# Patient Record
Sex: MALE | Race: WHITE | Employment: OTHER | URBAN - METROPOLITAN AREA
[De-identification: names, ages, dates, MRNs, and addresses within clinical notes are randomized per-mention and may not be internally consistent; named-entity substitution may affect disease eponyms.]

---

## 2017-01-09 RX ORDER — TADALAFIL 5 MG
TABLET ORAL
Qty: 90 TABLET | Refills: 3 | Status: SHIPPED | OUTPATIENT
Start: 2017-01-09 | End: 2017-12-29 | Stop reason: SDUPTHER

## 2017-03-14 ENCOUNTER — TELEPHONE (OUTPATIENT)
Dept: INTERNAL MEDICINE CLINIC | Age: 77
End: 2017-03-14

## 2017-03-15 ENCOUNTER — OFFICE VISIT (OUTPATIENT)
Dept: INTERNAL MEDICINE CLINIC | Age: 77
End: 2017-03-15

## 2017-03-15 VITALS
HEIGHT: 73 IN | RESPIRATION RATE: 14 BRPM | DIASTOLIC BLOOD PRESSURE: 80 MMHG | WEIGHT: 193.6 LBS | HEART RATE: 92 BPM | SYSTOLIC BLOOD PRESSURE: 120 MMHG | BODY MASS INDEX: 25.66 KG/M2

## 2017-03-15 DIAGNOSIS — I10 ESSENTIAL HYPERTENSION: Primary | ICD-10-CM

## 2017-03-15 DIAGNOSIS — R73.02 GLUCOSE INTOLERANCE (IMPAIRED GLUCOSE TOLERANCE): ICD-10-CM

## 2017-03-15 DIAGNOSIS — E55.9 VITAMIN D DEFICIENCY: ICD-10-CM

## 2017-03-15 DIAGNOSIS — E03.4 HYPOTHYROIDISM DUE TO ACQUIRED ATROPHY OF THYROID: ICD-10-CM

## 2017-03-15 DIAGNOSIS — R00.2 PALPITATIONS: ICD-10-CM

## 2017-03-15 DIAGNOSIS — N40.0 PROSTATE HYPERTROPHY: ICD-10-CM

## 2017-03-15 DIAGNOSIS — E78.2 MIXED HYPERLIPIDEMIA: ICD-10-CM

## 2017-03-15 DIAGNOSIS — K63.5 COLONIC POLYP: ICD-10-CM

## 2017-03-15 LAB
A/G RATIO: 2 (ref 1.1–2.2)
ALBUMIN SERPL-MCNC: 4.5 G/DL (ref 3.4–5)
ALP BLD-CCNC: 104 U/L (ref 40–129)
ALT SERPL-CCNC: 33 U/L (ref 10–40)
ANION GAP SERPL CALCULATED.3IONS-SCNC: 14 MMOL/L (ref 3–16)
AST SERPL-CCNC: 26 U/L (ref 15–37)
BASOPHILS ABSOLUTE: 0 K/UL (ref 0–0.2)
BASOPHILS RELATIVE PERCENT: 1 %
BILIRUB SERPL-MCNC: 0.4 MG/DL (ref 0–1)
BILIRUBIN URINE: NEGATIVE
BLOOD, URINE: NEGATIVE
BUN BLDV-MCNC: 21 MG/DL (ref 7–20)
CALCIUM SERPL-MCNC: 8.9 MG/DL (ref 8.3–10.6)
CHLORIDE BLD-SCNC: 106 MMOL/L (ref 99–110)
CHOLESTEROL, TOTAL: 161 MG/DL (ref 0–199)
CLARITY: ABNORMAL
CO2: 23 MMOL/L (ref 21–32)
COLOR: YELLOW
CREAT SERPL-MCNC: 0.9 MG/DL (ref 0.8–1.3)
EOSINOPHILS ABSOLUTE: 0 K/UL (ref 0–0.6)
EOSINOPHILS RELATIVE PERCENT: 0.9 %
EPITHELIAL CELLS, UA: 1 /HPF (ref 0–5)
GFR AFRICAN AMERICAN: >60
GFR NON-AFRICAN AMERICAN: >60
GLOBULIN: 2.2 G/DL
GLUCOSE BLD-MCNC: 131 MG/DL (ref 70–99)
GLUCOSE URINE: NEGATIVE MG/DL
HCT VFR BLD CALC: 47.5 % (ref 40.5–52.5)
HDLC SERPL-MCNC: 46 MG/DL (ref 40–60)
HEMOGLOBIN: 16.1 G/DL (ref 13.5–17.5)
HYALINE CASTS: 0 /HPF (ref 0–8)
KETONES, URINE: NEGATIVE MG/DL
LDL CHOLESTEROL CALCULATED: 101 MG/DL
LEUKOCYTE ESTERASE, URINE: NEGATIVE
LYMPHOCYTES ABSOLUTE: 1 K/UL (ref 1–5.1)
LYMPHOCYTES RELATIVE PERCENT: 24.3 %
MCH RBC QN AUTO: 34 PG (ref 26–34)
MCHC RBC AUTO-ENTMCNC: 33.8 G/DL (ref 31–36)
MCV RBC AUTO: 100.4 FL (ref 80–100)
MICROSCOPIC EXAMINATION: YES
MONOCYTES ABSOLUTE: 0.3 K/UL (ref 0–1.3)
MONOCYTES RELATIVE PERCENT: 7.4 %
NEUTROPHILS ABSOLUTE: 2.7 K/UL (ref 1.7–7.7)
NEUTROPHILS RELATIVE PERCENT: 66.4 %
NITRITE, URINE: NEGATIVE
PDW BLD-RTO: 13.3 % (ref 12.4–15.4)
PH UA: 5.5
PLATELET # BLD: 240 K/UL (ref 135–450)
PMV BLD AUTO: 7.2 FL (ref 5–10.5)
POTASSIUM SERPL-SCNC: 4.5 MMOL/L (ref 3.5–5.1)
PROSTATE SPECIFIC ANTIGEN: 3.02 NG/ML (ref 0–4)
PROTEIN UA: NEGATIVE MG/DL
RBC # BLD: 4.73 M/UL (ref 4.2–5.9)
RBC UA: 4 /HPF (ref 0–4)
SODIUM BLD-SCNC: 143 MMOL/L (ref 136–145)
SPECIFIC GRAVITY UA: 1.02
TOTAL PROTEIN: 6.7 G/DL (ref 6.4–8.2)
TRIGL SERPL-MCNC: 68 MG/DL (ref 0–150)
TSH SERPL DL<=0.05 MIU/L-ACNC: 1.66 UIU/ML (ref 0.27–4.2)
UROBILINOGEN, URINE: 0.2 E.U./DL
VITAMIN D 25-HYDROXY: 32.9 NG/ML
VLDLC SERPL CALC-MCNC: 14 MG/DL
WBC # BLD: 4.1 K/UL (ref 4–11)
WBC UA: 1 /HPF (ref 0–5)

## 2017-03-15 PROCEDURE — 81001 URINALYSIS AUTO W/SCOPE: CPT | Performed by: INTERNAL MEDICINE

## 2017-03-15 PROCEDURE — 99215 OFFICE O/P EST HI 40 MIN: CPT | Performed by: INTERNAL MEDICINE

## 2017-03-15 PROCEDURE — 93000 ELECTROCARDIOGRAM COMPLETE: CPT | Performed by: INTERNAL MEDICINE

## 2017-03-15 PROCEDURE — 36415 COLL VENOUS BLD VENIPUNCTURE: CPT | Performed by: INTERNAL MEDICINE

## 2017-03-16 LAB
ESTIMATED AVERAGE GLUCOSE: 154.2 MG/DL
HBA1C MFR BLD: 7 %

## 2017-03-20 DIAGNOSIS — K63.5 COLONIC POLYP: ICD-10-CM

## 2017-03-20 LAB
CONTROL: NORMAL
HEMOCCULT STL QL: NORMAL

## 2017-03-20 PROCEDURE — 82274 ASSAY TEST FOR BLOOD FECAL: CPT | Performed by: INTERNAL MEDICINE

## 2017-03-28 ENCOUNTER — TELEPHONE (OUTPATIENT)
Dept: INTERNAL MEDICINE CLINIC | Age: 77
End: 2017-03-28

## 2017-03-29 ENCOUNTER — OFFICE VISIT (OUTPATIENT)
Dept: INTERNAL MEDICINE CLINIC | Age: 77
End: 2017-03-29

## 2017-03-29 VITALS — HEART RATE: 68 BPM | SYSTOLIC BLOOD PRESSURE: 136 MMHG | DIASTOLIC BLOOD PRESSURE: 74 MMHG | RESPIRATION RATE: 12 BRPM

## 2017-03-29 DIAGNOSIS — E78.2 MIXED HYPERLIPIDEMIA: ICD-10-CM

## 2017-03-29 DIAGNOSIS — N40.0 PROSTATE HYPERTROPHY: ICD-10-CM

## 2017-03-29 DIAGNOSIS — E11.9 TYPE 2 DIABETES MELLITUS WITHOUT COMPLICATION, WITHOUT LONG-TERM CURRENT USE OF INSULIN (HCC): Primary | ICD-10-CM

## 2017-03-29 DIAGNOSIS — I10 ESSENTIAL HYPERTENSION: ICD-10-CM

## 2017-03-29 PROCEDURE — 99213 OFFICE O/P EST LOW 20 MIN: CPT | Performed by: INTERNAL MEDICINE

## 2017-03-29 ASSESSMENT — PATIENT HEALTH QUESTIONNAIRE - PHQ9
1. LITTLE INTEREST OR PLEASURE IN DOING THINGS: 0
2. FEELING DOWN, DEPRESSED OR HOPELESS: 0
SUM OF ALL RESPONSES TO PHQ9 QUESTIONS 1 & 2: 0
SUM OF ALL RESPONSES TO PHQ QUESTIONS 1-9: 0

## 2017-03-30 ENCOUNTER — TELEPHONE (OUTPATIENT)
Dept: INTERNAL MEDICINE CLINIC | Age: 77
End: 2017-03-30

## 2017-04-03 RX ORDER — GLUCOSAMINE HCL/CHONDROITIN SU 500-400 MG
CAPSULE ORAL
Qty: 100 STRIP | Refills: 5 | Status: SHIPPED | OUTPATIENT
Start: 2017-04-03 | End: 2017-05-01 | Stop reason: SDUPTHER

## 2017-05-01 RX ORDER — GLUCOSAMINE HCL/CHONDROITIN SU 500-400 MG
CAPSULE ORAL
Qty: 300 STRIP | Refills: 3 | Status: SHIPPED | OUTPATIENT
Start: 2017-05-01 | End: 2017-05-03 | Stop reason: SDUPTHER

## 2017-05-04 RX ORDER — GLUCOSAMINE HCL/CHONDROITIN SU 500-400 MG
CAPSULE ORAL
Qty: 200 STRIP | Refills: 3 | Status: SHIPPED | OUTPATIENT
Start: 2017-05-04 | End: 2018-01-15 | Stop reason: SDUPTHER

## 2017-05-16 PROBLEM — K40.90 LEFT INGUINAL HERNIA: Status: ACTIVE | Noted: 2017-05-16

## 2017-06-26 ENCOUNTER — TELEPHONE (OUTPATIENT)
Dept: INTERNAL MEDICINE CLINIC | Age: 77
End: 2017-06-26

## 2017-07-14 ENCOUNTER — TELEPHONE (OUTPATIENT)
Dept: INTERNAL MEDICINE CLINIC | Age: 77
End: 2017-07-14

## 2017-07-14 DIAGNOSIS — E03.4 IDIOPATHIC ATROPHIC HYPOTHYROIDISM: ICD-10-CM

## 2017-07-14 DIAGNOSIS — R73.09 IMPAIRED GLUCOSE TOLERANCE TEST: ICD-10-CM

## 2017-07-14 DIAGNOSIS — E78.2 MIXED HYPERLIPIDEMIA: ICD-10-CM

## 2017-07-14 DIAGNOSIS — I10 ESSENTIAL HYPERTENSION, MALIGNANT: Primary | ICD-10-CM

## 2017-07-14 DIAGNOSIS — R73.02 IMPAIRED GLUCOSE TOLERANCE IN NONOBESE: ICD-10-CM

## 2017-07-17 ENCOUNTER — TELEPHONE (OUTPATIENT)
Dept: INTERNAL MEDICINE CLINIC | Age: 77
End: 2017-07-17

## 2017-07-18 ENCOUNTER — NURSE ONLY (OUTPATIENT)
Dept: INTERNAL MEDICINE CLINIC | Age: 77
End: 2017-07-18

## 2017-07-18 DIAGNOSIS — R73.02 IMPAIRED GLUCOSE TOLERANCE IN NONOBESE: ICD-10-CM

## 2017-07-18 DIAGNOSIS — E78.2 MIXED HYPERLIPIDEMIA: ICD-10-CM

## 2017-07-18 DIAGNOSIS — E03.4 IDIOPATHIC ATROPHIC HYPOTHYROIDISM: ICD-10-CM

## 2017-07-18 DIAGNOSIS — I10 ESSENTIAL HYPERTENSION, MALIGNANT: ICD-10-CM

## 2017-07-18 LAB
ALBUMIN SERPL-MCNC: 4.1 G/DL (ref 3.4–5)
ALP BLD-CCNC: 68 U/L (ref 40–129)
ALT SERPL-CCNC: 14 U/L (ref 10–40)
ANION GAP SERPL CALCULATED.3IONS-SCNC: 12 MMOL/L (ref 3–16)
AST SERPL-CCNC: 18 U/L (ref 15–37)
BASOPHILS ABSOLUTE: 0.1 K/UL (ref 0–0.2)
BASOPHILS RELATIVE PERCENT: 1.3 %
BILIRUB SERPL-MCNC: 0.5 MG/DL (ref 0–1)
BILIRUBIN DIRECT: <0.2 MG/DL (ref 0–0.3)
BILIRUBIN, INDIRECT: ABNORMAL MG/DL (ref 0–1)
BUN BLDV-MCNC: 32 MG/DL (ref 7–20)
CALCIUM SERPL-MCNC: 8.7 MG/DL (ref 8.3–10.6)
CHLORIDE BLD-SCNC: 101 MMOL/L (ref 99–110)
CHOLESTEROL, TOTAL: 173 MG/DL (ref 0–199)
CO2: 25 MMOL/L (ref 21–32)
CREAT SERPL-MCNC: 0.9 MG/DL (ref 0.8–1.3)
EOSINOPHILS ABSOLUTE: 0.1 K/UL (ref 0–0.6)
EOSINOPHILS RELATIVE PERCENT: 2.3 %
GFR AFRICAN AMERICAN: >60
GFR NON-AFRICAN AMERICAN: >60
GLUCOSE BLD-MCNC: 95 MG/DL (ref 70–99)
HCT VFR BLD CALC: 41.8 % (ref 40.5–52.5)
HDLC SERPL-MCNC: 69 MG/DL (ref 40–60)
HEMOGLOBIN: 14 G/DL (ref 13.5–17.5)
LDL CHOLESTEROL CALCULATED: 95 MG/DL
LYMPHOCYTES ABSOLUTE: 0.8 K/UL (ref 1–5.1)
LYMPHOCYTES RELATIVE PERCENT: 18.8 %
MCH RBC QN AUTO: 35 PG (ref 26–34)
MCHC RBC AUTO-ENTMCNC: 33.6 G/DL (ref 31–36)
MCV RBC AUTO: 104.1 FL (ref 80–100)
MONOCYTES ABSOLUTE: 0.3 K/UL (ref 0–1.3)
MONOCYTES RELATIVE PERCENT: 7.2 %
NEUTROPHILS ABSOLUTE: 3 K/UL (ref 1.7–7.7)
NEUTROPHILS RELATIVE PERCENT: 70.4 %
PDW BLD-RTO: 14.4 % (ref 12.4–15.4)
PLATELET # BLD: 249 K/UL (ref 135–450)
PMV BLD AUTO: 7 FL (ref 5–10.5)
POTASSIUM SERPL-SCNC: 4.2 MMOL/L (ref 3.5–5.1)
RBC # BLD: 4.01 M/UL (ref 4.2–5.9)
SODIUM BLD-SCNC: 138 MMOL/L (ref 136–145)
TOTAL CK: 145 U/L (ref 39–308)
TOTAL PROTEIN: 6 G/DL (ref 6.4–8.2)
TRIGL SERPL-MCNC: 43 MG/DL (ref 0–150)
TSH SERPL DL<=0.05 MIU/L-ACNC: 1.4 UIU/ML (ref 0.27–4.2)
VLDLC SERPL CALC-MCNC: 9 MG/DL
WBC # BLD: 4.3 K/UL (ref 4–11)

## 2017-07-18 PROCEDURE — 36415 COLL VENOUS BLD VENIPUNCTURE: CPT | Performed by: INTERNAL MEDICINE

## 2017-07-19 LAB
ESTIMATED AVERAGE GLUCOSE: 122.6 MG/DL
HBA1C MFR BLD: 5.9 %

## 2017-08-08 ENCOUNTER — TELEPHONE (OUTPATIENT)
Dept: INTERNAL MEDICINE CLINIC | Age: 77
End: 2017-08-08

## 2017-08-09 ENCOUNTER — OFFICE VISIT (OUTPATIENT)
Dept: INTERNAL MEDICINE CLINIC | Age: 77
End: 2017-08-09

## 2017-08-09 DIAGNOSIS — E78.2 MIXED HYPERLIPIDEMIA: ICD-10-CM

## 2017-08-09 DIAGNOSIS — R73.02 GLUCOSE INTOLERANCE (IMPAIRED GLUCOSE TOLERANCE): ICD-10-CM

## 2017-08-09 DIAGNOSIS — I10 ESSENTIAL HYPERTENSION: Primary | ICD-10-CM

## 2017-08-09 PROCEDURE — 99213 OFFICE O/P EST LOW 20 MIN: CPT | Performed by: INTERNAL MEDICINE

## 2017-08-09 RX ORDER — ATORVASTATIN CALCIUM 20 MG/1
20 TABLET, FILM COATED ORAL DAILY
Qty: 90 TABLET | Refills: 3 | Status: SHIPPED | OUTPATIENT
Start: 2017-08-09 | End: 2018-07-24 | Stop reason: SDUPTHER

## 2017-08-09 RX ORDER — NITROFURANTOIN 25; 75 MG/1; MG/1
100 CAPSULE ORAL 2 TIMES DAILY
COMMUNITY
Start: 2017-07-27 | End: 2017-08-16

## 2017-08-09 RX ORDER — DOXAZOSIN MESYLATE 4 MG/1
TABLET ORAL
Qty: 90 TABLET | Refills: 3 | Status: SHIPPED | OUTPATIENT
Start: 2017-08-09 | End: 2017-12-05

## 2017-08-13 VITALS
HEART RATE: 60 BPM | WEIGHT: 166.8 LBS | BODY MASS INDEX: 22.01 KG/M2 | SYSTOLIC BLOOD PRESSURE: 130 MMHG | DIASTOLIC BLOOD PRESSURE: 70 MMHG

## 2017-11-21 ENCOUNTER — TELEPHONE (OUTPATIENT)
Dept: INTERNAL MEDICINE CLINIC | Age: 77
End: 2017-11-21

## 2017-11-21 DIAGNOSIS — E11.9 TYPE 2 DIABETES MELLITUS WITHOUT COMPLICATION, WITHOUT LONG-TERM CURRENT USE OF INSULIN (HCC): Primary | ICD-10-CM

## 2017-11-21 DIAGNOSIS — I10 ESSENTIAL HYPERTENSION: ICD-10-CM

## 2017-11-21 DIAGNOSIS — E78.2 MIXED HYPERLIPIDEMIA: ICD-10-CM

## 2017-12-04 ENCOUNTER — TELEPHONE (OUTPATIENT)
Dept: INTERNAL MEDICINE CLINIC | Age: 77
End: 2017-12-04

## 2017-12-05 ENCOUNTER — OFFICE VISIT (OUTPATIENT)
Dept: INTERNAL MEDICINE CLINIC | Age: 77
End: 2017-12-05

## 2017-12-05 VITALS
WEIGHT: 168.2 LBS | HEART RATE: 76 BPM | SYSTOLIC BLOOD PRESSURE: 120 MMHG | BODY MASS INDEX: 22.19 KG/M2 | DIASTOLIC BLOOD PRESSURE: 68 MMHG

## 2017-12-05 DIAGNOSIS — E78.2 MIXED HYPERLIPIDEMIA: ICD-10-CM

## 2017-12-05 DIAGNOSIS — N40.0 PROSTATE HYPERTROPHY: ICD-10-CM

## 2017-12-05 DIAGNOSIS — R73.02 GLUCOSE INTOLERANCE (IMPAIRED GLUCOSE TOLERANCE): Primary | ICD-10-CM

## 2017-12-05 PROCEDURE — 99213 OFFICE O/P EST LOW 20 MIN: CPT | Performed by: INTERNAL MEDICINE

## 2017-12-17 NOTE — PROGRESS NOTES
S: Patient presents with problems of prostate hypertrophy, hyperlipidemia, glucose intolerance, and Vitamin D deficiency. Doing well and using his medications as directed and without side effects. No headache, chest pain, or breathing difficulties. No urinary obstructive symptoms. No polyuria or polydipsia. He is following his low carb low fat diet. O:Blood pressure 120/68, pulse 76, weight 168 lb 3.2 oz (76.3 kg). Neck: No palpable lymph nodes, normal thyroid examination. Lungs: clear      Cardio: reg pulse      Abd: non tender, no guarding or rebound. Ext: no edema        Labs: from 11/22/17 Liver & CK normal, LDL 92 HDL 73 Trig 47, Hgba1c 6.2%, CBC normal. Lytes normal BUN 26 Creat 0.9, Glucose 96    A: glucose intolerance      Hyperlipidemia controlled on Lipitor       Prostate hypertrophy     P: Copy of labs given       Continue present medication and diet.        Daily walking        OV in April for H&P

## 2017-12-29 RX ORDER — TADALAFIL 5 MG
TABLET ORAL
Qty: 90 TABLET | Refills: 3 | Status: SHIPPED | OUTPATIENT
Start: 2017-12-29 | End: 2018-12-16 | Stop reason: SDUPTHER

## 2018-01-15 RX ORDER — GLUCOSAMINE HCL/CHONDROITIN SU 500-400 MG
CAPSULE ORAL
Qty: 200 STRIP | Refills: 3 | Status: SHIPPED | OUTPATIENT
Start: 2018-01-15

## 2018-04-02 ENCOUNTER — OFFICE VISIT (OUTPATIENT)
Dept: INTERNAL MEDICINE CLINIC | Age: 78
End: 2018-04-02

## 2018-04-02 VITALS
WEIGHT: 171.2 LBS | HEIGHT: 74 IN | BODY MASS INDEX: 21.97 KG/M2 | DIASTOLIC BLOOD PRESSURE: 62 MMHG | SYSTOLIC BLOOD PRESSURE: 120 MMHG | HEART RATE: 76 BPM

## 2018-04-02 DIAGNOSIS — I10 ESSENTIAL HYPERTENSION: Primary | ICD-10-CM

## 2018-04-02 DIAGNOSIS — E03.4 HYPOTHYROIDISM DUE TO ACQUIRED ATROPHY OF THYROID: ICD-10-CM

## 2018-04-02 DIAGNOSIS — E55.9 VITAMIN D DEFICIENCY: ICD-10-CM

## 2018-04-02 DIAGNOSIS — R00.2 PALPITATIONS: ICD-10-CM

## 2018-04-02 DIAGNOSIS — E78.2 MIXED HYPERLIPIDEMIA: ICD-10-CM

## 2018-04-02 DIAGNOSIS — N40.0 PROSTATE HYPERTROPHY: ICD-10-CM

## 2018-04-02 DIAGNOSIS — R01.1 SYSTOLIC MURMUR: ICD-10-CM

## 2018-04-02 DIAGNOSIS — R73.02 GLUCOSE INTOLERANCE (IMPAIRED GLUCOSE TOLERANCE): ICD-10-CM

## 2018-04-02 DIAGNOSIS — Z12.11 COLON CANCER SCREENING: ICD-10-CM

## 2018-04-02 LAB
A/G RATIO: 2.3 (ref 1.1–2.2)
ALBUMIN SERPL-MCNC: 4.6 G/DL (ref 3.4–5)
ALP BLD-CCNC: 81 U/L (ref 40–129)
ALT SERPL-CCNC: 18 U/L (ref 10–40)
ANION GAP SERPL CALCULATED.3IONS-SCNC: 13 MMOL/L (ref 3–16)
AST SERPL-CCNC: 19 U/L (ref 15–37)
BASOPHILS ABSOLUTE: 0.1 K/UL (ref 0–0.2)
BASOPHILS RELATIVE PERCENT: 1.6 %
BILIRUB SERPL-MCNC: 0.5 MG/DL (ref 0–1)
BILIRUBIN URINE: NEGATIVE
BLOOD, URINE: NEGATIVE
BUN BLDV-MCNC: 25 MG/DL (ref 7–20)
CALCIUM SERPL-MCNC: 8.7 MG/DL (ref 8.3–10.6)
CHLORIDE BLD-SCNC: 103 MMOL/L (ref 99–110)
CHOLESTEROL, TOTAL: 133 MG/DL (ref 0–199)
CLARITY: CLEAR
CO2: 26 MMOL/L (ref 21–32)
COLOR: YELLOW
COMMENT UA: NORMAL
CREAT SERPL-MCNC: 0.8 MG/DL (ref 0.8–1.3)
CREATININE URINE: 118.4 MG/DL (ref 39–259)
EOSINOPHILS ABSOLUTE: 0 K/UL (ref 0–0.6)
EOSINOPHILS RELATIVE PERCENT: 1.2 %
EPITHELIAL CELLS, UA: 1 /HPF (ref 0–5)
GFR AFRICAN AMERICAN: >60
GFR NON-AFRICAN AMERICAN: >60
GLOBULIN: 2 G/DL
GLUCOSE BLD-MCNC: 99 MG/DL (ref 70–99)
GLUCOSE URINE: NEGATIVE MG/DL
HCT VFR BLD CALC: 44.5 % (ref 40.5–52.5)
HDLC SERPL-MCNC: 75 MG/DL (ref 40–60)
HEMOGLOBIN: 15.1 G/DL (ref 13.5–17.5)
HYALINE CASTS: 0 /LPF (ref 0–8)
KETONES, URINE: NEGATIVE MG/DL
LDL CHOLESTEROL CALCULATED: 50 MG/DL
LEUKOCYTE ESTERASE, URINE: NEGATIVE
LYMPHOCYTES ABSOLUTE: 0.9 K/UL (ref 1–5.1)
LYMPHOCYTES RELATIVE PERCENT: 22 %
MCH RBC QN AUTO: 34.7 PG (ref 26–34)
MCHC RBC AUTO-ENTMCNC: 34 G/DL (ref 31–36)
MCV RBC AUTO: 102.2 FL (ref 80–100)
MICROALBUMIN UR-MCNC: 1.7 MG/DL
MICROALBUMIN/CREAT UR-RTO: 14.4 MG/G (ref 0–30)
MICROSCOPIC EXAMINATION: NORMAL
MONOCYTES ABSOLUTE: 0.3 K/UL (ref 0–1.3)
MONOCYTES RELATIVE PERCENT: 7.3 %
NEUTROPHILS ABSOLUTE: 2.8 K/UL (ref 1.7–7.7)
NEUTROPHILS RELATIVE PERCENT: 67.9 %
NITRITE, URINE: NEGATIVE
PDW BLD-RTO: 14.1 % (ref 12.4–15.4)
PH UA: 5.5
PLATELET # BLD: 218 K/UL (ref 135–450)
PMV BLD AUTO: 7.2 FL (ref 5–10.5)
POTASSIUM SERPL-SCNC: 4.2 MMOL/L (ref 3.5–5.1)
PROSTATE SPECIFIC ANTIGEN: 1.74 NG/ML (ref 0–4)
PROTEIN UA: NEGATIVE MG/DL
RBC # BLD: 4.35 M/UL (ref 4.2–5.9)
RBC UA: 0 /HPF (ref 0–4)
SODIUM BLD-SCNC: 142 MMOL/L (ref 136–145)
SPECIFIC GRAVITY UA: 1.02
TOTAL CK: 186 U/L (ref 39–308)
TOTAL PROTEIN: 6.6 G/DL (ref 6.4–8.2)
TRIGL SERPL-MCNC: 41 MG/DL (ref 0–150)
TSH SERPL DL<=0.05 MIU/L-ACNC: 1.55 UIU/ML (ref 0.27–4.2)
UROBILINOGEN, URINE: 0.2 E.U./DL
VITAMIN D 25-HYDROXY: 35 NG/ML
VLDLC SERPL CALC-MCNC: 8 MG/DL
WBC # BLD: 4.1 K/UL (ref 4–11)
WBC UA: 5 /HPF (ref 0–5)

## 2018-04-02 PROCEDURE — 99215 OFFICE O/P EST HI 40 MIN: CPT | Performed by: INTERNAL MEDICINE

## 2018-04-02 PROCEDURE — 36415 COLL VENOUS BLD VENIPUNCTURE: CPT | Performed by: INTERNAL MEDICINE

## 2018-04-02 PROCEDURE — 93000 ELECTROCARDIOGRAM COMPLETE: CPT | Performed by: INTERNAL MEDICINE

## 2018-04-02 PROCEDURE — 81001 URINALYSIS AUTO W/SCOPE: CPT | Performed by: INTERNAL MEDICINE

## 2018-04-02 RX ORDER — SILDENAFIL 100 MG/1
100 TABLET, FILM COATED ORAL PRN
Qty: 15 TABLET | Refills: 3 | Status: SHIPPED | OUTPATIENT
Start: 2018-04-02

## 2018-04-02 ASSESSMENT — PATIENT HEALTH QUESTIONNAIRE - PHQ9
SUM OF ALL RESPONSES TO PHQ9 QUESTIONS 1 & 2: 0
2. FEELING DOWN, DEPRESSED OR HOPELESS: 0
SUM OF ALL RESPONSES TO PHQ QUESTIONS 1-9: 0
1. LITTLE INTEREST OR PLEASURE IN DOING THINGS: 0

## 2018-04-03 LAB
ESTIMATED AVERAGE GLUCOSE: 116.9 MG/DL
HBA1C MFR BLD: 5.7 %

## 2018-04-04 DIAGNOSIS — Z12.11 COLON CANCER SCREENING: ICD-10-CM

## 2018-04-04 LAB
CONTROL: POSITIVE
HEMOCCULT STL QL: NEGATIVE

## 2018-04-04 PROCEDURE — 82274 ASSAY TEST FOR BLOOD FECAL: CPT | Performed by: INTERNAL MEDICINE

## 2018-04-10 ENCOUNTER — HOSPITAL ENCOUNTER (OUTPATIENT)
Dept: CARDIOLOGY | Age: 78
Discharge: HOME | End: 2018-05-09
Attending: INTERNAL MEDICINE | Admitting: INTERNAL MEDICINE

## 2018-04-10 DIAGNOSIS — R01.1 CARDIAC MURMUR: ICD-10-CM

## 2018-04-16 ENCOUNTER — OFFICE VISIT (OUTPATIENT)
Dept: INTERNAL MEDICINE CLINIC | Age: 78
End: 2018-04-16

## 2018-04-16 VITALS — DIASTOLIC BLOOD PRESSURE: 75 MMHG | SYSTOLIC BLOOD PRESSURE: 120 MMHG | HEART RATE: 88 BPM

## 2018-04-16 DIAGNOSIS — R30.0 DYSURIA: Primary | ICD-10-CM

## 2018-04-16 DIAGNOSIS — N40.0 PROSTATE HYPERTROPHY: ICD-10-CM

## 2018-04-16 DIAGNOSIS — E78.2 MIXED HYPERLIPIDEMIA: ICD-10-CM

## 2018-04-16 DIAGNOSIS — R73.02 GLUCOSE INTOLERANCE (IMPAIRED GLUCOSE TOLERANCE): ICD-10-CM

## 2018-04-16 PROCEDURE — G8427 DOCREV CUR MEDS BY ELIG CLIN: HCPCS | Performed by: INTERNAL MEDICINE

## 2018-04-16 PROCEDURE — 1036F TOBACCO NON-USER: CPT | Performed by: INTERNAL MEDICINE

## 2018-04-16 PROCEDURE — 81001 URINALYSIS AUTO W/SCOPE: CPT | Performed by: INTERNAL MEDICINE

## 2018-04-16 PROCEDURE — 4040F PNEUMOC VAC/ADMIN/RCVD: CPT | Performed by: INTERNAL MEDICINE

## 2018-04-16 PROCEDURE — G8420 CALC BMI NORM PARAMETERS: HCPCS | Performed by: INTERNAL MEDICINE

## 2018-04-16 PROCEDURE — 1123F ACP DISCUSS/DSCN MKR DOCD: CPT | Performed by: INTERNAL MEDICINE

## 2018-04-16 PROCEDURE — 99213 OFFICE O/P EST LOW 20 MIN: CPT | Performed by: INTERNAL MEDICINE

## 2018-04-17 DIAGNOSIS — N39.0 URINARY TRACT INFECTION WITHOUT HEMATURIA, SITE UNSPECIFIED: Primary | ICD-10-CM

## 2018-04-17 DIAGNOSIS — N39.0 URINARY TRACT INFECTION WITHOUT HEMATURIA, SITE UNSPECIFIED: ICD-10-CM

## 2018-04-17 LAB
BACTERIA: ABNORMAL /HPF
BILIRUBIN URINE: NEGATIVE
BLOOD, URINE: NEGATIVE
CLARITY: CLEAR
COLOR: YELLOW
EPITHELIAL CELLS, UA: 0 /HPF (ref 0–5)
GLUCOSE URINE: NEGATIVE MG/DL
HYALINE CASTS: 0 /LPF (ref 0–8)
KETONES, URINE: NEGATIVE MG/DL
LEUKOCYTE ESTERASE, URINE: ABNORMAL
MICROSCOPIC EXAMINATION: YES
NITRITE, URINE: POSITIVE
PH UA: 5.5
PROTEIN UA: NEGATIVE MG/DL
RBC UA: ABNORMAL /HPF (ref 0–2)
SPECIFIC GRAVITY UA: 1.02
UROBILINOGEN, URINE: 0.2 E.U./DL
WBC UA: 29 /HPF (ref 0–5)

## 2018-04-20 LAB
ORGANISM: ABNORMAL
URINE CULTURE, ROUTINE: ABNORMAL
URINE CULTURE, ROUTINE: ABNORMAL

## 2018-04-21 ENCOUNTER — TELEPHONE (OUTPATIENT)
Dept: INTERNAL MEDICINE CLINIC | Age: 78
End: 2018-04-21

## 2018-04-21 RX ORDER — DOXYCYCLINE HYCLATE 100 MG
100 TABLET ORAL 2 TIMES DAILY
Qty: 30 TABLET | Refills: 0 | Status: SHIPPED | OUTPATIENT
Start: 2018-04-21 | End: 2018-05-06

## 2018-05-07 ENCOUNTER — TELEPHONE (OUTPATIENT)
Dept: INTERNAL MEDICINE CLINIC | Age: 78
End: 2018-05-07

## 2018-05-07 DIAGNOSIS — N39.0 URINARY TRACT INFECTION WITHOUT HEMATURIA, SITE UNSPECIFIED: Primary | ICD-10-CM

## 2018-05-07 LAB
CRYSTALS, UA: ABNORMAL /HPF
EPITHELIAL CELLS, UA: 1 /HPF (ref 0–5)
HYALINE CASTS: 1 /LPF (ref 0–8)
RBC UA: 1 /HPF (ref 0–4)
WBC UA: 5 /HPF (ref 0–5)

## 2018-05-09 LAB — URINE CULTURE, ROUTINE: NORMAL

## 2018-07-24 RX ORDER — ATORVASTATIN CALCIUM 20 MG/1
TABLET, FILM COATED ORAL
Qty: 90 TABLET | Refills: 3 | Status: SHIPPED | OUTPATIENT
Start: 2018-07-24

## 2018-07-24 RX ORDER — DOXAZOSIN MESYLATE 4 MG/1
TABLET ORAL
Qty: 90 TABLET | Refills: 3 | Status: SHIPPED | OUTPATIENT
Start: 2018-07-24

## 2018-12-17 RX ORDER — TADALAFIL 5 MG
TABLET ORAL
Qty: 90 TABLET | Refills: 3 | Status: SHIPPED | OUTPATIENT
Start: 2018-12-17

## 2021-11-10 ENCOUNTER — TELEPHONE (OUTPATIENT)
Dept: CARDIOLOGY CLINIC | Age: 81
End: 2021-11-10

## 2021-11-17 ENCOUNTER — PROCEDURE VISIT (OUTPATIENT)
Dept: CARDIOLOGY CLINIC | Age: 81
End: 2021-11-17
Payer: MEDICARE

## 2021-11-17 DIAGNOSIS — I35.0 AORTIC VALVE STENOSIS, ETIOLOGY OF CARDIAC VALVE DISEASE UNSPECIFIED: Primary | ICD-10-CM

## 2021-11-17 LAB
LV EF: 48 %
LVEF MODALITY: NORMAL

## 2021-11-17 PROCEDURE — 93306 TTE W/DOPPLER COMPLETE: CPT | Performed by: INTERNAL MEDICINE

## 2021-11-29 ENCOUNTER — INITIAL CONSULT (OUTPATIENT)
Dept: CARDIOLOGY CLINIC | Age: 81
End: 2021-11-29
Payer: MEDICARE

## 2021-11-29 VITALS
DIASTOLIC BLOOD PRESSURE: 82 MMHG | WEIGHT: 172.2 LBS | HEIGHT: 74 IN | BODY MASS INDEX: 22.1 KG/M2 | SYSTOLIC BLOOD PRESSURE: 130 MMHG | HEART RATE: 71 BPM

## 2021-11-29 DIAGNOSIS — R94.31 ABNORMAL EKG: ICD-10-CM

## 2021-11-29 DIAGNOSIS — I50.20 HFREF (HEART FAILURE WITH REDUCED EJECTION FRACTION) (HCC): ICD-10-CM

## 2021-11-29 DIAGNOSIS — I35.0 AORTIC VALVE STENOSIS, ETIOLOGY OF CARDIAC VALVE DISEASE UNSPECIFIED: Primary | ICD-10-CM

## 2021-11-29 PROCEDURE — 99204 OFFICE O/P NEW MOD 45 MIN: CPT | Performed by: INTERNAL MEDICINE

## 2021-11-29 PROCEDURE — 93000 ELECTROCARDIOGRAM COMPLETE: CPT | Performed by: INTERNAL MEDICINE

## 2021-11-29 NOTE — PROGRESS NOTES
CARDIOLOGY NOTE      11/29/2021    RE: Skye Paniagua  (1940)                                TO:  Dr. Obdulia Sage MD      New pt      CHIEF Adri Donato is a [de-identified] y.o. male who was seen today for management of  VHD                                    HPI:                   Pt has h/o AR, AS , hyperlipidimea, seen today for  To establish. Pt has  No cardiac symptoms    Skye Paniagua has the following history recorded in care path:  Patient Active Problem List    Diagnosis Date Noted    Left inguinal hernia 05/16/2017    Hyperlipidemia 01/04/2015    Prostate hypertrophy 01/04/2015    Glucose intolerance (impaired glucose tolerance) 01/04/2015    Erectile dysfunction 01/04/2015     Current Outpatient Medications   Medication Sig Dispense Refill    CIALIS 5 MG tablet TAKE 1 TABLET DAILY 90 tablet 3    doxazosin (CARDURA) 4 MG tablet TAKE 1 TABLET AT BEDTIME ASDIRECTED 90 tablet 3    atorvastatin (LIPITOR) 20 MG tablet TAKE 1 TABLET DAILY FOR    CHOLESTEROL 90 tablet 3    sildenafil (VIAGRA) 100 MG tablet Take 1 tablet by mouth as needed for Erectile Dysfunction 15 tablet 3    Glucose Blood (BLOOD GLUCOSE TEST STRIPS) STRP Uses Freestyle Lite glucometer-tests twice daily or as needed Dx: E11.9 200 strip 3    aspirin EC 81 MG EC tablet Take 81 mg by mouth. Every other day      glucosamine-chondroitin (COSAMIN DS) 500-400 MG tablet Take 1 tablet by mouth daily. No current facility-administered medications for this visit.      Allergies: Sulfa antibiotics  Past Medical History:   Diagnosis Date    Bladder stones 08/2017    Dr Tammie Black    Colon polyp     DJD of shoulder     Left shoulder    Family history of prostate cancer     father    Prostate hypertrophy     Screening colonoscopy 8-    scattered diverticuli, 1 polyp,recommended 5 year follow up per Dr Beverley Stuart     Past Surgical History:   Procedure Laterality Date    INGUINAL HERNIA REPAIR      Right side    INGUINAL HERNIA REPAIR Left 2017    Dr Dipika Zhou    LITHOTRIPSY  2017    Cytolitholapaxy with lithotripsy-Dr Sarita Orozco    PROSTATE SURGERY  2017    [facility] - Dr Chiara Montano SKIN BIOPSY  3-    facial lesions benign    VASECTOMY        As reviewed   Family History   Problem Relation Age of Onset    Alzheimer's Disease Mother     Prostate Cancer Father         d. age 80 of 'old age'   [facility] Coronary Art Dis Father         ASHD- S/P CABG     Social History     Tobacco Use    Smoking status: Former Smoker     Quit date: 1966     Years since quittin.9    Smokeless tobacco: Never Used    Tobacco comment: 14-pack year history of cigarette smoking   Substance Use Topics    Alcohol use: No     Comment: 2 cups caffeine per day      Review of Systems:    Constitutional: Negative for diaphoresis and fatigue  Psychological:Negative for anxiety or depression  HENT: Negative for headaches, nasal congestion, sinus pain or vertigo  Eyes: Negative for visual disturbance. Endocrine: Negative for polydipsia/polyuria  Respiratory: Negative for shortness of breath  Cardiovascular: Negative for chest pain, dyspnea on exertion, claudication, edema, irregular heartbeat, murmur, palpitations or shortness of breath  Gastrointestinal: Negative for abdominal pain or heartburn  Genito-Urinary: Negative for urinary frequency/urgency  Musculoskeletal: Negative for muscle pain, muscular weakness, negative for pain in arm and leg or swelling in foot and leg  Neurological: Negative for dizziness, headaches, memory loss, numbness/tingling, visual changes, syncope  Dermatological: Negative for rash    Objective:    Vitals:    21 1333   BP: 130/82   Pulse: 71   Weight: 172 lb 3.2 oz (78.1 kg)   Height: 6' 2\" (1.88 m)     /82   Pulse 71   Ht 6' 2\" (1.88 m)   Wt 172 lb 3.2 oz (78.1 kg)   BMI 22.11 kg/m²     No flowsheet data found.      Wt Readings from Last 3 Encounters:   21 172 lb 3.2 oz (78.1 kg)   04/02/18 171 lb 3.2 oz (77.7 kg)   12/05/17 168 lb 3.2 oz (76.3 kg)     Body mass index is 22.11 kg/m². GENERAL - Alert, oriented, pleasant, in no apparent distress. EYES: No jaundice, no conjunctival pallor. SKIN: It is warm & dry. No rashes. No Echhymosis    HEENT - No clinically significant abnormalities seen. Neck - Supple. No jugular venous distention noted. No carotid bruits. Cardiovascular - Normal S1 and S2 with 2/6 sosa . Extremities - No cyanosis, clubbing, or significant edema. Pulmonary - No respiratory distress. No wheezes or rales. Abdomen - No masses, tenderness, or organomegaly. Musculoskeletal - No significant edema. No joint deformities. No muscle wasting. Neurologic - Cranial nerves II through XII are grossly intact. There were no gross focal neurologic abnormalities.     Lab Review   Lab Results   Component Value Date    CKTOTAL 186 04/02/2018     BNP:  No results found for: BNP  PT/INR:    Lab Results   Component Value Date    INR 1.00 08/02/2017     Lab Results   Component Value Date    LABA1C 5.7 04/02/2018    LABA1C 6.2 11/22/2017     Lab Results   Component Value Date    WBC 4.1 04/02/2018    HCT 44.5 04/02/2018    .2 (H) 04/02/2018     04/02/2018     Lab Results   Component Value Date    CHOL 133 04/02/2018    TRIG 41 04/02/2018    HDL 75 (H) 04/02/2018    LDLCALC 50 04/02/2018     Lab Results   Component Value Date    ALT 18 04/02/2018    AST 19 04/02/2018     BMP:    Lab Results   Component Value Date     04/02/2018    K 4.2 04/02/2018     04/02/2018    CO2 26 04/02/2018    BUN 25 04/02/2018    CREATININE 0.8 04/02/2018     CMP:   Lab Results   Component Value Date     04/02/2018    K 4.2 04/02/2018     04/02/2018    CO2 26 04/02/2018    BUN 25 04/02/2018    PROT 6.6 04/02/2018    PROT 6.3 04/05/2012     TSH:    Lab Results   Component Value Date    TSH 1.55 04/02/2018    TSHHS 1.337 02/17/2011        Conclusions      Summary Left ventricular size is normal, EF is estimated at 45-50%. E/A reversal; indeterminate diastolic function. No regional wall motion abnormalities were detected. Heavily calcified aortic valve with moderate aortic stenosis; mean PmmHG. TAYO: 1.24cm2. DVI: 0.4. Thickening of mitral valve leaflets is noted. Mild mitral and tricuspid regurgitation is present. RVSP is 33 mmHg. No evidence of pericardial effusion. Compared to previous echo in 2019, the mean gradient of the Aortic valve has   increased from 15mmHg to 21mmHg. Signature      ------------------------------------------------------------------   Electronically signed by Ana Maria Pina MD   (Interpreting physician) on 2021 at 11:01 AM   ------------------------------------------------------------------         Assessment & Plan:    -  LIPID MANAGEMENT:  Importance of lipid levels discussed with patient   and patient was given dietary advice. NCEP- ATP III guidelines reviewed with patient. -   Changes  in medicines made: No     On lipitor                    - low EF;    Stress cardiolite       - VHD:  Has  AS  Heavily calcified aortic valve with moderate aortic stenosis; mean PmmHG. TAYO: 1.24cm2. DVI: 0.4.           Radha Fuchs MD    [facility] [place]

## 2021-11-29 NOTE — PATIENT INSTRUCTIONS
**It is YOUR responsibilty to bring medication bottles and/or updated medication list to [address] [place] [address]. This will allow us to better serve you and all your healthcare needs**    Please be informed that if you contact our office outside of normal business hours the physician on call cannot help with any scheduling or rescheduling issues, procedure instruction questions or any type of medication issue. We advise you for any urgent/emergency that you go to the nearest emergency room! PLEASE CALL OUR OFFICE DURING NORMAL BUSINESS HOURS    Monday - Friday   8 am to 5 pm    [place] [address]: [phone]    [place]:  [phone]    Please hold on to these instructions the  will call you within 1-9 business days when we receive authorization from your insurance. Nuclear Stress Test    WHAT TO EXPECT:   ? You will need to confirm the test or it could be cancelled. ? This test will take approximately 2 hours: 1 hour in the AM &    1 hour in the PM. You will be given a time by the Technologist after the first part is completed to come back. ? You will be given a medication, through an IV in the hand, this will safely simulate exercise. This IV is also needed to inject the radioactive isotope unless you are able toe walk the treadmill. ? You will receive an injection in the AM & PM before the pictures. ? Using a special camera, you will have one set of pictures of your heart taken in the AM and a set of pictures in the PM.     PREPARATION FOR TEST:  ? Eat a light breakfast such as water or juice and toast.  ? If you are DIABETIC: Eat a normal breakfast with NO CAFFEINE and take your insulin as normal.   ? AVOID ALL FOODS & DRINKS containing CAFFEINE 12 HOURS PRIOR TO THE TEST: Including coffee, Tea, Flakito and other soft drinks even those labeled  caffeine free or decaffeinated.  ?  HOLD THESE MEDICATIONS Persantine & Theophylline (Theodur)  24 hours prior & bring your inhaler with you.   The physician will specify if the following Beta blockers need to be held for 24 hours prior to test:

## 2021-12-07 ENCOUNTER — PROCEDURE VISIT (OUTPATIENT)
Dept: CARDIOLOGY CLINIC | Age: 81
End: 2021-12-07
Payer: MEDICARE

## 2021-12-07 DIAGNOSIS — R94.31 ABNORMAL EKG: ICD-10-CM

## 2021-12-07 DIAGNOSIS — I50.20 HFREF (HEART FAILURE WITH REDUCED EJECTION FRACTION) (HCC): ICD-10-CM

## 2021-12-07 DIAGNOSIS — I35.0 AORTIC VALVE STENOSIS, ETIOLOGY OF CARDIAC VALVE DISEASE UNSPECIFIED: ICD-10-CM

## 2021-12-07 PROBLEM — I38 VHD (VALVULAR HEART DISEASE): Status: ACTIVE | Noted: 2021-12-07

## 2021-12-07 LAB
LV EF: 55 %
LVEF MODALITY: NORMAL

## 2021-12-07 PROCEDURE — A9500 TC99M SESTAMIBI: HCPCS | Performed by: INTERNAL MEDICINE

## 2021-12-07 PROCEDURE — 78452 HT MUSCLE IMAGE SPECT MULT: CPT | Performed by: INTERNAL MEDICINE

## 2021-12-07 PROCEDURE — 93016 CV STRESS TEST SUPVJ ONLY: CPT | Performed by: INTERNAL MEDICINE

## 2021-12-07 PROCEDURE — 93017 CV STRESS TEST TRACING ONLY: CPT | Performed by: INTERNAL MEDICINE

## 2021-12-07 PROCEDURE — 93018 CV STRESS TEST I&R ONLY: CPT | Performed by: INTERNAL MEDICINE

## 2021-12-08 ENCOUNTER — CLINICAL DOCUMENTATION (OUTPATIENT)
Dept: CARDIOLOGY CLINIC | Age: 81
End: 2021-12-08

## 2021-12-08 NOTE — PROGRESS NOTES
Conclusions       [facility] physician Dr. Amber Magallon .    Small sized defect of mild severity which is reversible involving septal    wall of myocardium. Abnormal Stress nuclear scintigraphic study suggestive    of abnormal myocardial perfusion. Mild degree of septal wall ischemia noted    involving a small sized area of left ventricular myocardium. Gated images    demonstrate normal left ventricular systolic function with EF of 55 %.         Recommendation    Suggest office visit to discuss results .        Signatures        ------------------------------------------------------------------    Electronically signed by Sindhu Roman MD    (Interpreting cardiologist) on 12/08/2021 at 06:54    ----------------------------------------------------

## 2021-12-10 ENCOUNTER — TELEPHONE (OUTPATIENT)
Dept: CARDIOLOGY CLINIC | Age: 81
End: 2021-12-10

## 2021-12-10 NOTE — TELEPHONE ENCOUNTER
LM for patient to return my call to set up an appointment with Dr. Francisco Beam sized defect of mild severity which is reversible involving septal    wall of myocardium. Abnormal Stress nuclear scintigraphic study suggestive    of abnormal myocardial perfusion. Mild degree of septal wall ischemia noted    involving a small sized area of left ventricular myocardium. Gated images    demonstrate normal left ventricular systolic function with EF of 55 %.      Recommendation    Suggest office visit to discuss results .

## 2022-01-13 ENCOUNTER — OFFICE VISIT (OUTPATIENT)
Dept: CARDIOLOGY CLINIC | Age: 82
End: 2022-01-13
Payer: MEDICARE

## 2022-01-13 VITALS
OXYGEN SATURATION: 93 % | SYSTOLIC BLOOD PRESSURE: 120 MMHG | DIASTOLIC BLOOD PRESSURE: 68 MMHG | BODY MASS INDEX: 22.72 KG/M2 | HEART RATE: 90 BPM | HEIGHT: 74 IN | WEIGHT: 177 LBS

## 2022-01-13 DIAGNOSIS — I50.20 HFREF (HEART FAILURE WITH REDUCED EJECTION FRACTION) (HCC): Primary | ICD-10-CM

## 2022-01-13 PROCEDURE — 99214 OFFICE O/P EST MOD 30 MIN: CPT | Performed by: INTERNAL MEDICINE

## 2022-01-13 NOTE — PROGRESS NOTES
CARDIOLOGY NOTE      1/13/2022    RE: Patrickwesley Dominga  (1940)                                TO:  Dr. Jayne Benson MD      New pt      CHIEF Salena Cockayne is a [de-identified] y.o. male who was seen today for management of  VHD                    Fu on stress                HPI:                   Pt has h/o AR, AS , hyperlipidimea, seen today for  To establish. Pt has  No cardiac symptoms  Here for fu on stress test    Edilberto Orr has the following history recorded in care path:  Patient Active Problem List    Diagnosis Date Noted    VHD (valvular heart disease) 12/07/2021    Left inguinal hernia 05/16/2017    Hyperlipidemia 01/04/2015    Prostate hypertrophy 01/04/2015    Glucose intolerance (impaired glucose tolerance) 01/04/2015    Erectile dysfunction 01/04/2015     Current Outpatient Medications   Medication Sig Dispense Refill    CIALIS 5 MG tablet TAKE 1 TABLET DAILY 90 tablet 3    doxazosin (CARDURA) 4 MG tablet TAKE 1 TABLET AT BEDTIME ASDIRECTED 90 tablet 3    atorvastatin (LIPITOR) 20 MG tablet TAKE 1 TABLET DAILY FOR    CHOLESTEROL 90 tablet 3    sildenafil (VIAGRA) 100 MG tablet Take 1 tablet by mouth as needed for Erectile Dysfunction 15 tablet 3    Glucose Blood (BLOOD GLUCOSE TEST STRIPS) STRP Uses Freestyle Lite glucometer-tests twice daily or as needed Dx: E11.9 200 strip 3    aspirin EC 81 MG EC tablet Take 81 mg by mouth. Every other day      glucosamine-chondroitin (COSAMIN DS) 500-400 MG tablet Take 1 tablet by mouth daily. No current facility-administered medications for this visit. Allergies: Sulfa antibiotics  Past Medical History:   Diagnosis Date    Bladder stones 08/2017    Dr Kiara Bynum    Colon polyp     DJD of shoulder     Left shoulder    Family history of prostate cancer     father    Hx of Doppler echocardiogram 11/01/2020    EF 50-55% Mild TR, Mod AS.     Prostate hypertrophy     Screening colonoscopy 08/30/2006    scattered diverticuli, 1 polyp,recommended 5 year follow up per Dr Jayshree Sepulveda VHD (valvular heart disease)      Past Surgical History:   Procedure Laterality Date    INGUINAL HERNIA REPAIR      Right side    INGUINAL HERNIA REPAIR Left 2017    Dr Vilchis Or    LITHOTRIPSY  2017    Cytolitholapaxy with lithotripsy-Dr Martine Peacock    PROSTATE SURGERY  2017    [facility] - Dr France Holcomb SKIN BIOPSY  3-    facial lesions benign    VASECTOMY        As reviewed   Family History   Problem Relation Age of Onset    Alzheimer's Disease Mother     Prostate Cancer Father         d. age 80 of 'old age'   [place] Coronary Art Dis Father         ASHD- S/P CABG     Social History     Tobacco Use    Smoking status: Former Smoker     Quit date: 1966     Years since quittin.0    Smokeless tobacco: Never Used    Tobacco comment: 14-pack year history of cigarette smoking   Substance Use Topics    Alcohol use: No     Comment: 2 cups caffeine per day      Review of Systems:    Constitutional: Negative for diaphoresis and fatigue  Psychological:Negative for anxiety or depression  HENT: Negative for headaches, nasal congestion, sinus pain or vertigo  Eyes: Negative for visual disturbance.    Endocrine: Negative for polydipsia/polyuria  Respiratory: Negative for shortness of breath  Cardiovascular: Negative for chest pain, dyspnea on exertion, claudication, edema, irregular heartbeat, murmur, palpitations or shortness of breath  Gastrointestinal: Negative for abdominal pain or heartburn  Genito-Urinary: Negative for urinary frequency/urgency  Musculoskeletal: Negative for muscle pain, muscular weakness, negative for pain in arm and leg or swelling in foot and leg  Neurological: Negative for dizziness, headaches, memory loss, numbness/tingling, visual changes, syncope  Dermatological: Negative for rash    Objective:    Vitals:    22 0916   BP: 120/68   Site: Right Upper Arm   Position: Sitting   Cuff Size: Medium Adult Pulse: 90   SpO2: 93%   Weight: 177 lb (80.3 kg)   Height: 6' 2\" (1.88 m)     /68 (Site: Right Upper Arm, Position: Sitting, Cuff Size: Medium Adult)   Pulse 90   Ht 6' 2\" (1.88 m)   Wt 177 lb (80.3 kg)   SpO2 93%   BMI 22.73 kg/m²     No flowsheet data found. Wt Readings from Last 3 Encounters:   01/13/22 177 lb (80.3 kg)   11/29/21 172 lb 3.2 oz (78.1 kg)   04/02/18 171 lb 3.2 oz (77.7 kg)     Body mass index is 22.73 kg/m². GENERAL - Alert, oriented, pleasant, in no apparent distress. EYES: No jaundice, no conjunctival pallor. SKIN: It is warm & dry. No rashes. No Echhymosis    HEENT - No clinically significant abnormalities seen. Neck - Supple. No jugular venous distention noted. No carotid bruits. Cardiovascular - Normal S1 and S2 with 2/6 sosa . Extremities - No cyanosis, clubbing, or significant edema. Pulmonary - No respiratory distress. No wheezes or rales. Abdomen - No masses, tenderness, or organomegaly. Musculoskeletal - No significant edema. No joint deformities. No muscle wasting. Neurologic - Cranial nerves II through XII are grossly intact. There were no gross focal neurologic abnormalities.     Lab Review   Lab Results   Component Value Date    CKTOTAL 186 04/02/2018     BNP:  No results found for: BNP  PT/INR:    Lab Results   Component Value Date    INR 1.00 08/02/2017     Lab Results   Component Value Date    LABA1C 5.7 04/02/2018    LABA1C 6.2 11/22/2017     Lab Results   Component Value Date    WBC 4.1 04/02/2018    HCT 44.5 04/02/2018    .2 (H) 04/02/2018     04/02/2018     Lab Results   Component Value Date    CHOL 133 04/02/2018    TRIG 41 04/02/2018    HDL 75 (H) 04/02/2018    LDLCALC 50 04/02/2018     Lab Results   Component Value Date    ALT 18 04/02/2018    AST 19 04/02/2018     BMP:    Lab Results   Component Value Date     04/02/2018    K 4.2 04/02/2018     04/02/2018    CO2 26 04/02/2018    BUN 25 04/02/2018    CREATININE 0.8 2018     CMP:   Lab Results   Component Value Date     2018    K 4.2 2018     2018    CO2 26 2018    BUN 25 2018    PROT 6.6 2018    PROT 6.3 2012     TSH:    Lab Results   Component Value Date    TSH 1.55 2018    TSHHS 1.337 2011        Conclusions      Summary   Left ventricular size is normal, EF is estimated at 45-50%. E/A reversal; indeterminate diastolic function. No regional wall motion abnormalities were detected. Heavily calcified aortic valve with moderate aortic stenosis; mean PmmHG. TAYO: 1.24cm2. DVI: 0.4. Thickening of mitral valve leaflets is noted. Mild mitral and tricuspid regurgitation is present. RVSP is 33 mmHg. No evidence of pericardial effusion. Compared to previous echo in 2019, the mean gradient of the Aortic valve has   increased from 15mmHg to 21mmHg. Signature      ------------------------------------------------------------------   Electronically signed by Justin Gill MD   (Interpreting physician) on 2021 at 11:01 AM   ------------------------------------------------------------------         Assessment & Plan:    -  LIPID MANAGEMENT:  Importance of lipid levels discussed with patient   and patient was given dietary advice. NCEP- ATP III guidelines reviewed with patient. -   Changes  in medicines made: No     On lipitor                    - low EF;    Stress cardiolite   Has no symptoms ; med therapy    Conclusions   [facility] Woodard physician Dr. Arvil Simmonds .    Small sized defect of mild severity which is reversible involving septal    wall of myocardium. Abnormal Stress nuclear scintigraphic study suggestive    of abnormal myocardial perfusion. Mild degree of septal wall ischemia noted    involving a small sized area of left ventricular myocardium. Gated images    demonstrate normal left ventricular systolic function with EF of 55 %.         Recommendation  Suggest office visit to discuss results .        Signatures        ------------------------------------------------------------------    Electronically signed by Fred Ventura MD   [id number] [facility] cardiologist) on 2021 at 06:54       . Tracey Mendez .    - VHD:  Has  AS  Heavily calcified aortic valve with moderate aortic stenosis; mean PmmHG. TAYO: 1.24cm2. DVI: 0.4.               Adrian Senior MD    [facility] [place]

## 2022-01-13 NOTE — PATIENT INSTRUCTIONS
Please be informed that if you contact our office outside of normal business hours the physician on call cannot help with any scheduling or rescheduling issues, procedure instruction questions or any type of medication issue. We advise you for any urgent/emergency that you go to the nearest emergency room! PLEASE CALL OUR OFFICE DURING NORMAL BUSINESS HOURS    Monday - Friday   8 am to 5 pm    [place]: [place] [address]: [phone]    [place]:  [phone]    **It is YOUR responsibilty to bring medication bottles and/or updated medication list to [address] [place] [address].  This will allow us to better serve you and all your healthcare needs**

## 2022-03-04 ENCOUNTER — TELEPHONE (OUTPATIENT)
Dept: CARDIOLOGY CLINIC | Age: 82
End: 2022-03-04

## 2022-03-04 NOTE — TELEPHONE ENCOUNTER
Patient called to get his account balance- and requested a stmt. I gave the phone # to billing and his acct.  #

## 2022-07-13 ENCOUNTER — OFFICE VISIT (OUTPATIENT)
Dept: CARDIOLOGY CLINIC | Age: 82
End: 2022-07-13
Payer: MEDICARE

## 2022-07-13 VITALS
SYSTOLIC BLOOD PRESSURE: 120 MMHG | DIASTOLIC BLOOD PRESSURE: 60 MMHG | HEIGHT: 74 IN | WEIGHT: 163.8 LBS | BODY MASS INDEX: 21.02 KG/M2 | OXYGEN SATURATION: 97 % | HEART RATE: 75 BPM

## 2022-07-13 DIAGNOSIS — E78.2 MIXED HYPERLIPIDEMIA: ICD-10-CM

## 2022-07-13 DIAGNOSIS — I38 VHD (VALVULAR HEART DISEASE): Primary | ICD-10-CM

## 2022-07-13 PROCEDURE — 99214 OFFICE O/P EST MOD 30 MIN: CPT | Performed by: NURSE PRACTITIONER

## 2022-07-13 PROCEDURE — 1123F ACP DISCUSS/DSCN MKR DOCD: CPT | Performed by: NURSE PRACTITIONER

## 2022-07-13 ASSESSMENT — ENCOUNTER SYMPTOMS
SHORTNESS OF BREATH: 0
ORTHOPNEA: 0

## 2022-07-13 NOTE — PATIENT INSTRUCTIONS
Please be informed that if you contact our office outside of normal business hours the physician on call cannot help with any scheduling or rescheduling issues, procedure instruction questions or any type of medication issue. We advise you for any urgent/emergency that you go to the nearest emergency room! PLEASE CALL OUR OFFICE DURING NORMAL BUSINESS HOURS    Monday - Friday   8 am to 5 pm    [place] [address]: [phone]    [place]:  [phone]    **It is YOUR responsibilty to bring medication bottles and/or updated medication list to [address] [place] [address]. This will allow us to better serve you and all your healthcare needs**    Please hold on to these instructions the  will call you within 1-9 business days when we receive authorization from your insurance. Echocardiogram    WHAT TO EXPECT:   ? This test will take approximately 45 minutes. ? It is an ultrasound of the heart. ? It can look at the valves and chambers inside the heart   ? There is no special instructions for this test.     If you are unable to keep this appointment, please notify us 24 hours prior to test at [phone].

## 2022-07-13 NOTE — PROGRESS NOTES
7/13/2022  Primary cardiologist: Dr. Myesha Low:   Paty Calhoun  is an established 81 y.o.  male here for a follow-up valvular heart disease,  AS, hyperlipidemia and  mildly reduced left ventricular systolic function      SUBJECTIVE/OBJECTIVE:  Paty Calhoun is a 81 y.o. male with a history of valvular heart disease moderate aortic stenosis, hyperlipidemia mildly reduced left ventricular systolic function EF 13-17%  In December 2021 Paty Calhoun underwent noninvasive testing with stress Cardiolite that showed small size defect mild severity which is reversible of the septal wall. At that time he was asymptomatic and opted for medical management. HPI :   Paty Calhoun reports he is is currently being treated for urinary tract infection. States since urinary tract infection was diagnosed he feels tired. He denies chest pain, shortness of breath, palpitations or near syncope. Review of Systems   Constitutional: Positive for malaise/fatigue. Negative for diaphoresis. Cardiovascular: Negative for chest pain, claudication, dyspnea on exertion, irregular heartbeat, leg swelling, near-syncope, orthopnea, palpitations and paroxysmal nocturnal dyspnea. Respiratory: Negative for shortness of breath. Neurological: Negative for dizziness and light-headedness. Vitals:    07/13/22 0822   BP: 120/60   Site: Left Upper Arm   Position: Sitting   Cuff Size: Medium Adult   Pulse: 75   SpO2: 97%   Weight: 163 lb 12.8 oz (74.3 kg)   Height: 6' 2\" (1.88 m)     No flowsheet data found. Wt Readings from Last 3 Encounters:   07/13/22 163 lb 12.8 oz (74.3 kg)   01/13/22 177 lb (80.3 kg)   11/29/21 172 lb 3.2 oz (78.1 kg)     Body mass index is 21.03 kg/m². Physical Exam  Cardiovascular:      Rate and Rhythm: Normal rate and regular rhythm. Heart sounds: Murmur heard. Systolic murmur is present with a grade of 3/6.        Comments: Varicosities to lower legs  Pulmonary:      Effort: Pulmonary effort is normal.      Breath sounds: Normal breath sounds. No wheezing or rales. Abdominal:      General: There is no distension. Tenderness: There is no abdominal tenderness. Musculoskeletal:      Right lower leg: No edema. Left lower leg: No edema. Skin:     General: Skin is warm and dry. Capillary Refill: Capillary refill takes less than 2 seconds. Neurological:      General: No focal deficit present. Mental Status: He is alert. Psychiatric:         Mood and Affect: Mood normal.         Behavior: Behavior normal.                Current Outpatient Medications   Medication Sig Dispense Refill    CIALIS 5 MG tablet TAKE 1 TABLET DAILY 90 tablet 3    doxazosin (CARDURA) 4 MG tablet TAKE 1 TABLET AT BEDTIME ASDIRECTED 90 tablet 3    atorvastatin (LIPITOR) 20 MG tablet TAKE 1 TABLET DAILY FOR    CHOLESTEROL 90 tablet 3    sildenafil (VIAGRA) 100 MG tablet Take 1 tablet by mouth as needed for Erectile Dysfunction 15 tablet 3    Glucose Blood (BLOOD GLUCOSE TEST STRIPS) STRP Uses Freestyle Lite glucometer-tests twice daily or as needed Dx: E11.9 200 strip 3    aspirin EC 81 MG EC tablet Take 81 mg by mouth. Every other day      glucosamine-chondroitin (COSAMIN DS) 500-400 MG tablet Take 1 tablet by mouth daily. No current facility-administered medications for this visit. All pertinent data reviewed and discussed with patient       ASSESSMENT/PLAN:    valvular heart disease  Noted to have moderate AS with TAYO 1.24  Denies symptoms such as chest pain or shortness of breath  Recommend to check echo for ongoing surveillance     Abn NM  He is asymptomatic- continue with medical management with ASA and atorvastatin     Mildly reduced left ventricular systolic function   Echocardiogram revealed mildly reduced left ventricular systolic function with EF 45 to 50%. :-Clinically does not appear to be in heart failure and appears to be stable.   We will continue with ongoing surveillance            Tests ordered: Echocardiogram  Follow-up 6 months    Signed:  ALY Caro CNP, 7/13/2022, 8:30 AM    An electronic signature was used to authenticate this note. Please note this report has been partially produced using speech recognition software and may contain errors related to that system including errors in grammar, punctuation, and spelling, as well as words and phrases that may be inappropriate. If there are any questions or concerns please feel free to contact the dictating provider for clarification.

## 2022-07-16 ENCOUNTER — APPOINTMENT (OUTPATIENT)
Dept: CT IMAGING | Age: 82
End: 2022-07-16
Payer: MEDICARE

## 2022-07-16 ENCOUNTER — APPOINTMENT (OUTPATIENT)
Dept: GENERAL RADIOLOGY | Age: 82
End: 2022-07-16
Payer: MEDICARE

## 2022-07-16 ENCOUNTER — HOSPITAL ENCOUNTER (EMERGENCY)
Age: 82
Discharge: ANOTHER HOSPITAL | End: 2022-07-16
Attending: STUDENT IN AN ORGANIZED HEALTH CARE EDUCATION/TRAINING PROGRAM
Payer: MEDICARE

## 2022-07-16 VITALS
TEMPERATURE: 100.5 F | HEART RATE: 90 BPM | OXYGEN SATURATION: 95 % | WEIGHT: 160 LBS | RESPIRATION RATE: 16 BRPM | SYSTOLIC BLOOD PRESSURE: 119 MMHG | HEIGHT: 74 IN | BODY MASS INDEX: 20.53 KG/M2 | DIASTOLIC BLOOD PRESSURE: 64 MMHG

## 2022-07-16 DIAGNOSIS — N12 PYELONEPHRITIS: Primary | ICD-10-CM

## 2022-07-16 DIAGNOSIS — A41.9 SEPSIS WITHOUT ACUTE ORGAN DYSFUNCTION, DUE TO UNSPECIFIED ORGANISM (HCC): ICD-10-CM

## 2022-07-16 DIAGNOSIS — M54.50 ACUTE BILATERAL LOW BACK PAIN WITHOUT SCIATICA: ICD-10-CM

## 2022-07-16 LAB
ALBUMIN SERPL-MCNC: 2.9 GM/DL (ref 3.4–5)
ALP BLD-CCNC: 122 IU/L (ref 40–129)
ALT SERPL-CCNC: 30 U/L (ref 10–40)
ANION GAP SERPL CALCULATED.3IONS-SCNC: 11 MMOL/L (ref 4–16)
AST SERPL-CCNC: 44 IU/L (ref 15–37)
BACTERIA: ABNORMAL /HPF
BASOPHILS ABSOLUTE: 0 K/CU MM
BASOPHILS RELATIVE PERCENT: 0.3 % (ref 0–1)
BILIRUB SERPL-MCNC: 0.4 MG/DL (ref 0–1)
BILIRUBIN URINE: NEGATIVE MG/DL
BLOOD, URINE: NEGATIVE
BUN BLDV-MCNC: 27 MG/DL (ref 6–23)
CALCIUM SERPL-MCNC: 8.7 MG/DL (ref 8.3–10.6)
CAST TYPE: ABNORMAL /HPF
CHLORIDE BLD-SCNC: 101 MMOL/L (ref 99–110)
CLARITY: CLEAR
CO2: 25 MMOL/L (ref 21–32)
COLOR: YELLOW
CREAT SERPL-MCNC: 0.9 MG/DL (ref 0.9–1.3)
CRYSTAL TYPE: NEGATIVE /HPF
DIFFERENTIAL TYPE: ABNORMAL
EOSINOPHILS ABSOLUTE: 0 K/CU MM
EOSINOPHILS RELATIVE PERCENT: 0.1 % (ref 0–3)
EPITHELIAL CELLS, UA: 0 /HPF
GFR AFRICAN AMERICAN: >60 ML/MIN/1.73M2
GFR NON-AFRICAN AMERICAN: >60 ML/MIN/1.73M2
GLUCOSE BLD-MCNC: 200 MG/DL (ref 70–99)
GLUCOSE, URINE: NEGATIVE MG/DL
HCT VFR BLD CALC: 33.9 % (ref 42–52)
HEMOGLOBIN: 11 GM/DL (ref 13.5–18)
IMMATURE NEUTROPHIL %: 0.5 % (ref 0–0.43)
KETONES, URINE: ABNORMAL MG/DL
LACTIC ACID, SEPSIS: 1.6 MMOL/L (ref 0.5–1.9)
LEUKOCYTE ESTERASE, URINE: NEGATIVE
LIPASE: 45 IU/L (ref 13–60)
LYMPHOCYTES ABSOLUTE: 0.6 K/CU MM
LYMPHOCYTES RELATIVE PERCENT: 8.4 % (ref 24–44)
MCH RBC QN AUTO: 32.9 PG (ref 27–31)
MCHC RBC AUTO-ENTMCNC: 32.4 % (ref 32–36)
MCV RBC AUTO: 101.5 FL (ref 78–100)
MONOCYTES ABSOLUTE: 0.5 K/CU MM
MONOCYTES RELATIVE PERCENT: 6.1 % (ref 0–4)
NITRITE URINE, QUANTITATIVE: NEGATIVE
PDW BLD-RTO: 14.3 % (ref 11.7–14.9)
PH, URINE: 5 (ref 5–8)
PLATELET # BLD: 186 K/CU MM (ref 140–440)
PMV BLD AUTO: 9.2 FL (ref 7.5–11.1)
POTASSIUM SERPL-SCNC: 4.1 MMOL/L (ref 3.5–5.1)
PRO-BNP: 820.7 PG/ML
PROTEIN UA: ABNORMAL MG/DL
RAPID INFLUENZA  B AGN: NEGATIVE
RAPID INFLUENZA A AGN: NEGATIVE
RBC # BLD: 3.34 M/CU MM (ref 4.6–6.2)
RBC URINE: 0 /HPF (ref 0–3)
SARS-COV-2, NAAT: NOT DETECTED
SEGMENTED NEUTROPHILS ABSOLUTE COUNT: 6.4 K/CU MM
SEGMENTED NEUTROPHILS RELATIVE PERCENT: 84.6 % (ref 36–66)
SODIUM BLD-SCNC: 137 MMOL/L (ref 135–145)
SOURCE: NORMAL
SPECIFIC GRAVITY UA: 1.02 (ref 1–1.03)
TOTAL IMMATURE NEUTOROPHIL: 0.04 K/CU MM
TOTAL PROTEIN: 6.2 GM/DL (ref 6.4–8.2)
TROPONIN T: <0.01 NG/ML
UROBILINOGEN, URINE: 0.2 MG/DL (ref 0.2–1)
WBC # BLD: 7.5 K/CU MM (ref 4–10.5)
WBC UA: <0 /HPF (ref 0–2)

## 2022-07-16 PROCEDURE — 87086 URINE CULTURE/COLONY COUNT: CPT

## 2022-07-16 PROCEDURE — 96367 TX/PROPH/DG ADDL SEQ IV INF: CPT

## 2022-07-16 PROCEDURE — 87186 SC STD MICRODIL/AGAR DIL: CPT

## 2022-07-16 PROCEDURE — 2580000003 HC RX 258: Performed by: STUDENT IN AN ORGANIZED HEALTH CARE EDUCATION/TRAINING PROGRAM

## 2022-07-16 PROCEDURE — 71045 X-RAY EXAM CHEST 1 VIEW: CPT

## 2022-07-16 PROCEDURE — 74177 CT ABD & PELVIS W/CONTRAST: CPT

## 2022-07-16 PROCEDURE — 6360000002 HC RX W HCPCS: Performed by: STUDENT IN AN ORGANIZED HEALTH CARE EDUCATION/TRAINING PROGRAM

## 2022-07-16 PROCEDURE — 2580000003 HC RX 258: Performed by: EMERGENCY MEDICINE

## 2022-07-16 PROCEDURE — 87804 INFLUENZA ASSAY W/OPTIC: CPT

## 2022-07-16 PROCEDURE — 87635 SARS-COV-2 COVID-19 AMP PRB: CPT

## 2022-07-16 PROCEDURE — 6370000000 HC RX 637 (ALT 250 FOR IP): Performed by: EMERGENCY MEDICINE

## 2022-07-16 PROCEDURE — 85025 COMPLETE CBC W/AUTO DIFF WBC: CPT

## 2022-07-16 PROCEDURE — 87040 BLOOD CULTURE FOR BACTERIA: CPT

## 2022-07-16 PROCEDURE — 81001 URINALYSIS AUTO W/SCOPE: CPT

## 2022-07-16 PROCEDURE — 71275 CT ANGIOGRAPHY CHEST: CPT

## 2022-07-16 PROCEDURE — 76376 3D RENDER W/INTRP POSTPROCES: CPT

## 2022-07-16 PROCEDURE — 93005 ELECTROCARDIOGRAM TRACING: CPT | Performed by: STUDENT IN AN ORGANIZED HEALTH CARE EDUCATION/TRAINING PROGRAM

## 2022-07-16 PROCEDURE — 96365 THER/PROPH/DIAG IV INF INIT: CPT

## 2022-07-16 PROCEDURE — 83880 ASSAY OF NATRIURETIC PEPTIDE: CPT

## 2022-07-16 PROCEDURE — 6360000004 HC RX CONTRAST MEDICATION: Performed by: STUDENT IN AN ORGANIZED HEALTH CARE EDUCATION/TRAINING PROGRAM

## 2022-07-16 PROCEDURE — 87150 DNA/RNA AMPLIFIED PROBE: CPT

## 2022-07-16 PROCEDURE — 80053 COMPREHEN METABOLIC PANEL: CPT

## 2022-07-16 PROCEDURE — 83605 ASSAY OF LACTIC ACID: CPT

## 2022-07-16 PROCEDURE — 83690 ASSAY OF LIPASE: CPT

## 2022-07-16 PROCEDURE — 99285 EMERGENCY DEPT VISIT HI MDM: CPT

## 2022-07-16 PROCEDURE — 96375 TX/PRO/DX INJ NEW DRUG ADDON: CPT

## 2022-07-16 PROCEDURE — 96366 THER/PROPH/DIAG IV INF ADDON: CPT

## 2022-07-16 PROCEDURE — 84484 ASSAY OF TROPONIN QUANT: CPT

## 2022-07-16 RX ORDER — CYCLOBENZAPRINE HCL 10 MG
10 TABLET ORAL
COMMUNITY
Start: 2022-07-14

## 2022-07-16 RX ORDER — 0.9 % SODIUM CHLORIDE 0.9 %
20 VIAL (ML) INJECTION
Status: COMPLETED | OUTPATIENT
Start: 2022-07-16 | End: 2022-07-16

## 2022-07-16 RX ORDER — 0.9 % SODIUM CHLORIDE 0.9 %
1000 INTRAVENOUS SOLUTION INTRAVENOUS ONCE
Status: COMPLETED | OUTPATIENT
Start: 2022-07-16 | End: 2022-07-16

## 2022-07-16 RX ORDER — KETOROLAC TROMETHAMINE 30 MG/ML
15 INJECTION, SOLUTION INTRAMUSCULAR; INTRAVENOUS ONCE
Status: COMPLETED | OUTPATIENT
Start: 2022-07-16 | End: 2022-07-16

## 2022-07-16 RX ORDER — FENTANYL CITRATE 50 UG/ML
50 INJECTION, SOLUTION INTRAMUSCULAR; INTRAVENOUS ONCE
Status: DISCONTINUED | OUTPATIENT
Start: 2022-07-16 | End: 2022-07-17 | Stop reason: HOSPADM

## 2022-07-16 RX ORDER — ACETAMINOPHEN 325 MG/1
650 TABLET ORAL ONCE
Status: COMPLETED | OUTPATIENT
Start: 2022-07-16 | End: 2022-07-16

## 2022-07-16 RX ADMIN — VANCOMYCIN HYDROCHLORIDE 1500 MG: 1 INJECTION, POWDER, LYOPHILIZED, FOR SOLUTION INTRAVENOUS at 21:05

## 2022-07-16 RX ADMIN — ACETAMINOPHEN 650 MG: 325 TABLET ORAL at 21:34

## 2022-07-16 RX ADMIN — KETOROLAC TROMETHAMINE 15 MG: 30 INJECTION, SOLUTION INTRAMUSCULAR; INTRAVENOUS at 20:03

## 2022-07-16 RX ADMIN — SODIUM CHLORIDE 1000 ML: 9 INJECTION, SOLUTION INTRAVENOUS at 21:39

## 2022-07-16 RX ADMIN — IOPAMIDOL 75 ML: 755 INJECTION, SOLUTION INTRAVENOUS at 19:08

## 2022-07-16 RX ADMIN — Medication 20 ML: at 19:08

## 2022-07-16 RX ADMIN — CEFEPIME HYDROCHLORIDE 2000 MG: 2 INJECTION, POWDER, FOR SOLUTION INTRAVENOUS at 19:54

## 2022-07-16 ASSESSMENT — PAIN DESCRIPTION - DESCRIPTORS: DESCRIPTORS: ACHING

## 2022-07-16 ASSESSMENT — PAIN DESCRIPTION - LOCATION
LOCATION: BACK
LOCATION: BACK

## 2022-07-16 ASSESSMENT — PAIN - FUNCTIONAL ASSESSMENT
PAIN_FUNCTIONAL_ASSESSMENT: 0-10
PAIN_FUNCTIONAL_ASSESSMENT: 0-10

## 2022-07-16 ASSESSMENT — PAIN DESCRIPTION - FREQUENCY
FREQUENCY: CONTINUOUS
FREQUENCY: INTERMITTENT

## 2022-07-16 ASSESSMENT — PAIN SCALES - GENERAL
PAINLEVEL_OUTOF10: 3
PAINLEVEL_OUTOF10: 5
PAINLEVEL_OUTOF10: 3

## 2022-07-16 ASSESSMENT — PAIN DESCRIPTION - ORIENTATION
ORIENTATION: LOWER
ORIENTATION: LOWER

## 2022-07-16 NOTE — ED NOTES
Pt complaining of pain while in CT scan, order received for pain medication but the patient did not want to take Fentanyl for pain, his skin temp felt warmer to the touch while repositioning him on the Cat scanner table so will recheck oral temperature when he returns.       Nathen Rice RN  07/16/22 1911

## 2022-07-16 NOTE — ED PROVIDER NOTES
Emergency Department Encounter    Patient: Eitan Morgan  MRN: [id number]  : 1940  Date of Evaluation: 2022  ED Provider:  Cate Garcia MD    Triage Chief Complaint:   No chief complaint on file. Citizen [place]:  Ok Child is a 81 y.o. male presenting with complaints of back pain. Patient states intermittently over the past month he has been diagnosed with urinary tract infections by his primary doctor. States he has been intermittently on antibiotics. Patient states he was just started on antibiotic by his primary doctor but is unsure what antibiotic he has been on. Patient states over the past month he is also had back pain worsening over the past several days. Denies change in bowel or bladder but states that the pain has significantly hampered his ability to walk at home. Patient also states he has been having intermittent fevers with last temperature yesterday of 102. Patient is afebrile in the ED initially but on reevaluation has a temp of 101. Patient denies chest pain or shortness of breath, lightheadedness or dizziness. Denies nausea vomiting, change in bowel habits, change in urination patient states he has very mild lower extremity edema denies any pain in his lower extremities. ROS - see HPI, below listed is current ROS at time of my eval:  At least 14 systems reviewed, negative other HPI    Past Medical History:   Diagnosis Date    Bladder stones 2017    Dr Jim Alexis    Colon polyp     DJD of shoulder     Left shoulder    Family history of prostate cancer     father    Hx of Doppler echocardiogram 2020    EF 50-55% Mild TR, Mod AS.     Prostate hypertrophy     Screening colonoscopy 2006    scattered diverticuli, 1 polyp,recommended 5 year follow up per Dr Nancy Wilkerson    VHD (valvular heart disease)      Past Surgical History:   Procedure Laterality Date    INGUINAL HERNIA REPAIR      Right side    INGUINAL HERNIA REPAIR Left 2017    Dr Luana Briseno LITHOTRIPSY  2017    Cytolitholapaxy with lithotripsy-Dr Caro Carry    PROSTATE SURGERY  2017    [facility] - Dr Caro Carry    SKIN BIOPSY  3-    facial lesions benign    VASECTOMY       Family History   Problem Relation Age of Onset    Alzheimer's Disease Mother     Prostate Cancer Father         d. age 80 of 'old age'    Coronary Art Dis Father         ASHD- S/P CABG     Social History     Socioeconomic History    Marital status:      Spouse name: Not on file    Number of children: Not on file    Years of education: Not on file    Highest education level: Not on file   Occupational History    Not on file   Tobacco Use    Smoking status: Former     Types: Cigarettes     Quit date: 1966     Years since quittin.5    Smokeless tobacco: Never    Tobacco comments:     14-pack year history of cigarette smoking   Substance and Sexual Activity    Alcohol use: No     Comment: 2 cups caffeine per day    Drug use: No    Sexual activity: Not on file   Other Topics Concern    Not on file   Social History Narrative    Not on file     Social Determinants of Health     Financial Resource Strain: Not on file   Food Insecurity: Not on file   Transportation Needs: Not on file   Physical Activity: Not on file   Stress: Not on file   Social Connections: Not on file   Intimate Partner Violence: Not on file   Housing Stability: Not on file     No current facility-administered medications for this encounter.      Current Outpatient Medications   Medication Sig Dispense Refill    CIALIS 5 MG tablet TAKE 1 TABLET DAILY 90 tablet 3    doxazosin (CARDURA) 4 MG tablet TAKE 1 TABLET AT BEDTIME ASDIRECTED 90 tablet 3    atorvastatin (LIPITOR) 20 MG tablet TAKE 1 TABLET DAILY FOR    CHOLESTEROL 90 tablet 3    sildenafil (VIAGRA) 100 MG tablet Take 1 tablet by mouth as needed for Erectile Dysfunction (Patient not taking: Reported on 2022) 15 tablet 3    Glucose Blood (BLOOD GLUCOSE TEST STRIPS) STRP Uses Freestyle Lite glucometer-tests twice daily or as needed Dx: E11.9 200 strip 3    aspirin EC 81 MG EC tablet Take 81 mg by mouth. Every other day      glucosamine-chondroitin (COSAMIN DS) 500-400 MG tablet Take 1 tablet by mouth daily. Allergies   Allergen Reactions    Sulfa Antibiotics Rash       Nursing Notes Reviewed    Physical Exam:  Triage VS:    ED Triage Vitals   Enc Vitals Group      BP       Pulse       Resp       Temp       Temp src       SpO2       Weight       Height       Head Circumference       Peak Flow       Pain Score       Pain Loc       Pain Edu? Excl. in [address]? My pulse ox interpretation is - normal    General appearance:  No acute distress. Skin:  Warm. Dry. Eye:  Extraocular movements intact. Ears, nose, mouth and throat:  Oral mucosa moist   Neck:  Trachea midline. Extremity:  No swelling. Normal ROM     Heart:  Regular rate and rhythm, normal S1 & S2, no extra heart sounds. Perfusion:  intact  Respiratory:  Lungs clear to auscultation bilaterally. Respirations nonlabored. Abdominal:  Normal bowel sounds. Soft. No significant tenderness palpation of the anterior abdomen, no rebound or guarding  Back:  No CVA tenderness to palpation, tenderness palpation diffusely over the lower back. Patient does have central tenderness palpation along the lower T-spine and upper L-spine as well as in the musculature of the lower back, no significant CVA tenderness or flank pain. Neurological:  Alert and oriented times 3. No focal neuro deficits. Psychiatric:  Appropriate    I have reviewed and interpreted all of the currently available lab results from this visit (if applicable):  No results found for this visit on 07/16/22. Radiographs (if obtained):  Radiologist's Report Reviewed:  No results found.     EKG (if obtained): (All EKG's are interpreted by myself in the absence of a cardiologist)  Normal sinus rhythm, ventricular rate 95, SC interval 166, QRS duration 88, QTc 419, no significant ST elevation or depression, no significant ischemic changes    MDM:    71-year-old male presenting with complaints of back pain. Patient has a fever. Patient mildly tachycardic 103 on presentation. Otherwise vitals are reassuring and patient afebrile satting well on room air. On reevaluation patient does have a temp of 101. Exam can be seen above. CBC reveals no leukocytosis. Hemoglobin is 11.0. Patient has no recent hemoglobin. CMP is overall reassuring. EKG is nonacute and Juli Ivan is within normal limits. Lipase is nonelevated. UA shows negative leuk esterases and negative nitrates with trace bacteria. Lactate is not elevated. BNP mildly elevated at 820 but not significantly elevated for age. Chest x-ray is nonacute. CT T-spine, L-spine as well as abdomen pelvis ordered and pending. Blood cultures are pending. With fever and tachycardia patient was given broad-spectrum antibiotics in the ED. Patient signed out to oncoming physician Dr. Shanta Allison. Clinical Impression:  1. SIRS (systemic inflammatory response syndrome) (HCC)    2. Acute bilateral low back pain without sciatica          Comment: Please note this report has been produced using speech recognition software and may contain errors related to that system including errors in grammar, punctuation, and spelling, as well as words and phrases that may be inappropriate. Efforts were made to edit the dictations.         Kylee Torres MD  07/16/22 2731

## 2022-07-16 NOTE — ED PROVIDER NOTES
eMERGENCY dEPARTMENT eNCOUnter    ATTENDING SIGN OUT NOTE    HPI/Physical Exam/Medical Decision Making  Time: 19:50  I have received sign out of [facility]  Emergency Department care from Dr. Cindy Menchaca. We discussed the history, physical exam, completed/pending test results (if obtained) and current treatment plan. Please refer to his/her chart for further details. In brief, the patient is a 81 y.o. male who presented to the ED with lower back pain. The patient has had recurrent urinary tract infections for the past several weeks. He has been on antibiotics at times. He was recently started on an antibiotic for another urinary tract infection by his doctor. He is unsure what antibiotic this is. He has been having intermittent fevers. Work-up thus far is significant for anemia with a hemoglobin of 11, a normal white blood cell count with a left shift, urinalysis that appears negative for infection, normal lactic acid, and a negative COVID and influenza. He has been treated with IV cefepime, vancomycin and Toradol. Chest x-ray is negative. The patient is pending a CTA of the chest, CT abdomen pelvis, and CT of the thoracic and lumbar spine reconstructions. I am asked to follow-up these results and to disposition the patient. Is this patient to be included in the SEP-1 Core Measure due to severe sepsis or septic shock? No   Exclusion criteria - the patient is NOT to be included for SEP-1 Core Measure due to:  May have criteria for sepsis, but does not meet criteria for severe sepsis or septic shock      20:15-20:45  Imaging has resulted. CTA of the chest is negative for pulmonary embolism or acute pulmonary abnormality. Thoracic spine CT shows multilevel degenerative change of the thoracic spine. CT abdomen pelvis shows wedge-shaped areas of hypoenhancement within the kidneys bilaterally, specifically the left, with perinephric fat stranding.   Findings suggest pyelonephritis and urinary tract infection. There is a moderate amount of stool within the colon. The lumbar spine CT shows no acute fracture or traumatic malalignment. I suspect that the patient has bilateral pyelonephritis and sepsis. He does not meet criteria for severe sepsis or septic shock. He was treated with Tylenol and 1 L of IV normal saline. He already received antibiotics. I discussed the results with the patient and his family. I recommended transfer to a hospital for admission and the patient preferred transfer to [address] [place] [address]. The transfer process was initiated. 21:18-21:30  I spoke with Gean Cheadle, nurse practitioner at [address] [place] [address] who is covering for the hospitalist service. She accepted the patient in transfer under Dr. Mehul Emery. The patient was updated. He is in stable condition awaiting transport. Diagnostics:       CT LUMBAR RECONSTRUCTION WO POST PROCESS (Final result)  Result time 07/16/22 20:12:44  Procedure changed from [employer]  Final result by Booker Berg MD (07/16/22 20:12:44)                Impression:    Abdomen and pelvis CT:     Wedge-shaped areas of hypoenhancement within the kidneys bilaterally,   especially the left, with perinephric fat stranding, findings which would   suggest pyelonephritis and urinary tract infection. Moderate amount of stool within the colon. Please correlate with clinical   evidence of constipation. Lumbar spine CT:     No acute fracture or traumatic malalignment. Narrative:    EXAMINATION:   CT OF THE ABDOMEN AND PELVIS WITH CONTRAST; CT OF THE LUMBAR SPINE WITHOUT   CONTRAST 7/16/2022 3:43 pm     TECHNIQUE:   CT of the abdomen and pelvis was performed with the administration of   intravenous contrast. Multiplanar reformatted images are provided for review.    Automated exposure control, iterative reconstruction, and/or weight based   adjustment of the mA/kV was utilized to reduce the radiation dose to as low   as reasonably achievable.; CT of the lumbar spine was performed without the   administration of intravenous contrast. Multiplanar reformatted images are   provided for review. Adjustment of mA and/or kV according to patient size   was utilized. Automated exposure control, iterative reconstruction, and/or   weight based adjustment of the mA/kV was utilized to reduce the radiation   dose to as low as reasonably achievable. COMPARISON:   None. HISTORY:   ORDERING SYSTEM PROVIDED HISTORY: back pain abdominal pain   TECHNOLOGIST PROVIDED HISTORY:   Reason for exam:->back pain abdominal pain   Additional Contrast?->None   Decision Support Exception - unselect if not a suspected or confirmed   emergency medical condition->Emergency Medical Condition ([place])   Reason for Exam: back pain abdominal pain   Additional signs and symptoms: chest pain, fever   Relevant Medical/Surgical History: 75cc [id number]     FINDINGS:   Abdomen and pelvis CT:     Lower Chest: Please see the report for the chest CTA performed concurrently. Organs: Scattered hepatic cysts are seen, which require no specific   follow-up. No acute or suspicious hepatic abnormality identified. Portal   vein is patent. Gallbladder is decompressed. Spleen enhances normally. Adrenals unremarkable. Pancreas unremarkable. There is perinephric fat stranding seen bilaterally, greater on the left. Wedge-shaped areas of hypoenhancement are seen within the kidneys as well,   especially on the left. Mild urothelial thickening is suggested. No   hydronephrosis is identified. GI/Bowel: Distal esophagus and stomach unremarkable. Duodenal sweep and the   remainder of the small bowel are unremarkable. Moderate amount of stool is noted within the colon. Appendix is not well   visualized. Pelvis: No free pelvic fluid is found. Urinary bladder unremarkable. Moderate to marked prostatic hypertrophy.   The prostate creates mass-effect   upon the bladder base.     Peritoneum/Retroperitoneum: Abdominal aorta is normal in caliber. No   retroperitoneal lymphadenopathy. Superior mesenteric artery is normally   enhancing. Bones/Soft Tissues: No acute bony abnormalities are detected within the   abdomen pelvis. The extra-abdominal and extra pelvic soft tissues are   unremarkable. Lumbar spine CT:     Bones/Alignment: No acute fracture or traumatic malalignment is seen within   the lumbar spine. Degenerative Changes: Multilevel degenerative disc disease is present,   greatest L3-L4 and L5-S1. No high-grade central canal stenosis is found. Soft Tissues: No paravertebral edema identified. Paraspinal muscles are   symmetric. CT THORACIC RECONSTRUCTION WO POST PROCESS (Final result)  Result time 07/16/22 20:06:20  Procedure changed from [employer]  Final result by Go Bautista MD (07/16/22 20:06:20)                Impression:    1. No evidence of pulmonary embolism or acute pulmonary abnormality. 2. Multilevel degenerative change of the thoracic spine. Narrative:    EXAMINATION:   CTA OF THE CHEST; CT OF THE THORACIC SPINE WITHOUT CONTRAST 7/16/2022 7:04 pm     TECHNIQUE:   CTA of the chest was performed after the administration of intravenous   contrast.  Multiplanar reformatted images are provided for review. MIP   images are provided for review. Automated exposure control, iterative   reconstruction, and/or weight based adjustment of the mA/kV was utilized to   reduce the radiation dose to as low as reasonably achievable.; CT of the   thoracic spine was performed without the administration of intravenous   contrast. Multiplanar reformatted images are provided for review. Automated   exposure control, iterative reconstruction, and/or weight based adjustment of   the mA/kV was utilized to reduce the radiation dose to as low as reasonably   achievable. COMPARISON:   None.      HISTORY:   ORDERING SYSTEM PROVIDED HISTORY: pain   TECHNOLOGIST PROVIDED HISTORY:   Reason for exam:->pain   Decision Support Exception - unselect if not a suspected or confirmed   emergency medical condition->Emergency Medical Condition (MA)   Reason for Exam: pain   Additional signs and symptoms: back pain, chest iona, fever   Relevant Medical/Surgical History: 75cc [id number]; ORDERING SYSTEM PROVIDED   HISTORY: pain   TECHNOLOGIST PROVIDED HISTORY:   Reason for exam:->pain   Decision Support Exception - unselect if not a suspected or confirmed   emergency medical condition->Emergency Medical Condition (MA)   Reason for Exam: pain   Additional signs and symptoms: back pain, fever     FINDINGS:   Pulmonary Arteries: Pulmonary arteries are adequately opacified for   evaluation. No evidence of intraluminal filling defect to suggest pulmonary   embolism. Evaluation of the right upper lobe is mildly limited due to streak   artifact. Main pulmonary artery is normal in caliber. Mediastinum: No evidence of mediastinal lymphadenopathy. The heart and   pericardium demonstrate no acute abnormality. There is no acute abnormality   of the thoracic aorta. Coronary artery atherosclerosis. Lungs/pleura: The lungs are without acute process. No focal consolidation or   pulmonary edema. No evidence of pleural effusion or pneumothorax. Subsegmental atelectasis in the lung bases. Upper Abdomen: Abdominal findings will be dictated separately. Soft Tissues/Bones: Dextrocurvature in the upper thoracic spine. No   significant listhesis. Vertebral body heights are maintained. No displaced   fracture. There is multilevel degenerative change of the thoracic spine.                        CT ABDOMEN PELVIS W IV CONTRAST Additional Contrast? None (Final result)  Result time 07/16/22 20:12:44  Final result by Puja Angelo MD (07/16/22 20:12:44)                Impression:    Abdomen and pelvis CT:     Wedge-shaped areas of 7:04 pm     TECHNIQUE:   CTA of the chest was performed after the administration of intravenous   contrast.  Multiplanar reformatted images are provided for review. MIP   images are provided for review. Automated exposure control, iterative   reconstruction, and/or weight based adjustment of the mA/kV was utilized to   reduce the radiation dose to as low as reasonably achievable.; CT of the   thoracic spine was performed without the administration of intravenous   contrast. Multiplanar reformatted images are provided for review. Automated   exposure control, iterative reconstruction, and/or weight based adjustment of   the mA/kV was utilized to reduce the radiation dose to as low as reasonably   achievable. COMPARISON:   None. HISTORY:   ORDERING SYSTEM PROVIDED HISTORY: pain   TECHNOLOGIST PROVIDED HISTORY:   Reason for exam:->pain   Decision Support Exception - unselect if not a suspected or confirmed   emergency medical condition->Emergency Medical Condition (MA)   Reason for Exam: pain   Additional signs and symptoms: back pain, chest iona, fever   Relevant Medical/Surgical History: 75cc [id number]; ORDERING SYSTEM PROVIDED   HISTORY: pain   TECHNOLOGIST PROVIDED HISTORY:   Reason for exam:->pain   Decision Support Exception - unselect if not a suspected or confirmed   emergency medical condition->Emergency Medical Condition (MA)   Reason for Exam: pain   Additional signs and symptoms: back pain, fever     FINDINGS:   Pulmonary Arteries: Pulmonary arteries are adequately opacified for   evaluation. No evidence of intraluminal filling defect to suggest pulmonary   embolism. Evaluation of the right upper lobe is mildly limited due to streak   artifact. Main pulmonary artery is normal in caliber. Mediastinum: No evidence of mediastinal lymphadenopathy. The heart and   pericardium demonstrate no acute abnormality. There is no acute abnormality   of the thoracic aorta.   Coronary artery atherosclerosis. Lungs/pleura: The lungs are without acute process. No focal consolidation or   pulmonary edema. No evidence of pleural effusion or pneumothorax. Subsegmental atelectasis in the lung bases. Upper Abdomen: Abdominal findings will be dictated separately. Soft Tissues/Bones: Dextrocurvature in the upper thoracic spine. No   significant listhesis. Vertebral body heights are maintained. No displaced   fracture. There is multilevel degenerative change of the thoracic spine. XR CHEST PORTABLE (Final result)  Result time 07/16/22 18:24:14  Final result by Rose Mendoza MD (07/16/22 18:24:14)                Impression:    No acute cardiopulmonary abnormality. Narrative:    EXAMINATION:   ONE XRAY VIEW OF THE CHEST     7/16/2022 5:58 pm     COMPARISON:   None. HISTORY:   ORDERING SYSTEM PROVIDED HISTORY: fever   TECHNOLOGIST PROVIDED HISTORY:   Reason for exam:->fever     FINDINGS:   2 images submitted for review. The lungs are clear. The cardiac and mediastinal contours are normal.  There   is no pleural effusion or pneumothorax. No acute osseous abnormality is   identified.                    Labs Reviewed   CBC WITH AUTO DIFFERENTIAL - Abnormal; Notable for the following components:       Result Value    RBC 3.34 (*)     Hemoglobin 11.0 (*)     Hematocrit 33.9 (*)     .5 (*)     MCH 32.9 (*)     Segs Relative 84.6 (*)     Lymphocytes % 8.4 (*)     Monocytes % 6.1 (*)     Immature Neutrophil % 0.5 (*)     All other components within normal limits   COMPREHENSIVE METABOLIC PANEL - Abnormal; Notable for the following components:    BUN 27 (*)     Glucose 200 (*)     Albumin 2.9 (*)     Total Protein 6.2 (*)     AST 44 (*)     All other components within normal limits   URINALYSIS - Abnormal; Notable for the following components:    Ketones, Urine TRACE (*)     Protein, UA TRACE (*)     WBC, UA <0 (*)     Bacteria, UA TRACE (*)     All other components within normal limits   BRAIN NATRIURETIC PEPTIDE - Abnormal; Notable for the following components:    Pro-.7 (*)     All other components within normal limits   COVID-19, RAPID   RAPID INFLUENZA A/B ANTIGENS   CULTURE, BLOOD 1   CULTURE, BLOOD 2   CULTURE, URINE   TROPONIN   LIPASE   LACTATE, SEPSIS       Clinical Impression:  1. Pyelonephritis    2. Acute bilateral low back pain without sciatica    3. Sepsis without acute organ dysfunction, due to unspecified organism [facility])              Comment: Please note this report has been produced using speech recognition software and may contain errors related to that system including errors in grammar, punctuation, and spelling, as well as words and phrases that may be inappropriate. If there are any questions or concerns please feel free to contact the dictating provider for clarification.         Simon Shafer MD  07/17/22 [id number]

## 2022-07-17 LAB
EKG ATRIAL RATE: 95 BPM
EKG DIAGNOSIS: NORMAL
EKG P AXIS: 72 DEGREES
EKG P-R INTERVAL: 166 MS
EKG Q-T INTERVAL: 334 MS
EKG QRS DURATION: 88 MS
EKG QTC CALCULATION (BAZETT): 419 MS
EKG R AXIS: 61 DEGREES
EKG T AXIS: 47 DEGREES
EKG VENTRICULAR RATE: 95 BPM

## 2022-07-17 PROCEDURE — 93010 ELECTROCARDIOGRAM REPORT: CPT | Performed by: INTERNAL MEDICINE

## 2022-07-18 LAB
CULTURE: NORMAL
Lab: NORMAL
SPECIMEN: NORMAL

## 2022-07-19 LAB
CULTURE: ABNORMAL
Lab: ABNORMAL
Lab: ABNORMAL
SPECIMEN: ABNORMAL
SPECIMEN: ABNORMAL

## 2022-09-21 PROBLEM — I33.0 BACTERIAL ENDOCARDITIS: Status: ACTIVE | Noted: 2022-09-21

## 2022-09-22 ENCOUNTER — HOSPITAL ENCOUNTER (OUTPATIENT)
Age: 82
Setting detail: SPECIMEN
Discharge: HOME | End: 2022-09-22
Payer: MEDICARE

## 2022-09-22 LAB — GENTAMICIN TROUGH: 0.7 UG/ML

## 2022-09-22 PROCEDURE — 80170 ASSAY OF GENTAMICIN: CPT

## 2022-09-26 ENCOUNTER — HOSPITAL ENCOUNTER (OUTPATIENT)
Age: 82
Setting detail: SPECIMEN
Discharge: HOME | End: 2022-09-26
Payer: MEDICARE

## 2022-09-26 LAB
BASOPHILS ABSOLUTE: 0 K/CU MM
BASOPHILS RELATIVE PERCENT: 0.4 % (ref 0–1)
DIFFERENTIAL TYPE: ABNORMAL
EOSINOPHILS ABSOLUTE: 0.1 K/CU MM
EOSINOPHILS RELATIVE PERCENT: 1.4 % (ref 0–3)
ERYTHROCYTE SEDIMENTATION RATE: 3 MM/HR (ref 0–20)
HCT VFR BLD CALC: 36.8 % (ref 42–52)
HEMOGLOBIN: 12 GM/DL (ref 13.5–18)
IMMATURE NEUTROPHIL %: 0.4 % (ref 0–0.43)
LYMPHOCYTES ABSOLUTE: 0.6 K/CU MM
LYMPHOCYTES RELATIVE PERCENT: 12.2 % (ref 24–44)
MCH RBC QN AUTO: 33.9 PG (ref 27–31)
MCHC RBC AUTO-ENTMCNC: 32.6 % (ref 32–36)
MCV RBC AUTO: 104 FL (ref 78–100)
MONOCYTES ABSOLUTE: 0.4 K/CU MM
MONOCYTES RELATIVE PERCENT: 7.3 % (ref 0–4)
PDW BLD-RTO: 14.8 % (ref 11.7–14.9)
PLATELET # BLD: 133 K/CU MM (ref 140–440)
PMV BLD AUTO: 9 FL (ref 7.5–11.1)
RBC # BLD: 3.54 M/CU MM (ref 4.6–6.2)
SEGMENTED NEUTROPHILS ABSOLUTE COUNT: 4.1 K/CU MM
SEGMENTED NEUTROPHILS RELATIVE PERCENT: 78.3 % (ref 36–66)
TOTAL IMMATURE NEUTOROPHIL: 0.02 K/CU MM
WBC # BLD: 5.2 K/CU MM (ref 4–10.5)

## 2022-09-26 PROCEDURE — 86140 C-REACTIVE PROTEIN: CPT

## 2022-09-26 PROCEDURE — 85025 COMPLETE CBC W/AUTO DIFF WBC: CPT

## 2022-09-26 PROCEDURE — 85652 RBC SED RATE AUTOMATED: CPT

## 2022-09-28 LAB — C-REACTIVE PROTEIN, HIGH SENSITIVITY: 0.5 MG/L

## 2022-10-10 ENCOUNTER — HOSPITAL ENCOUNTER (OUTPATIENT)
Age: 82
Setting detail: SPECIMEN
Discharge: HOME | End: 2022-10-10
Payer: MEDICARE

## 2022-10-10 LAB
BASOPHILS ABSOLUTE: 0 K/CU MM
BASOPHILS RELATIVE PERCENT: 0.4 % (ref 0–1)
C-REACTIVE PROTEIN, HIGH SENSITIVITY: 53.9 MG/L
DIFFERENTIAL TYPE: ABNORMAL
EOSINOPHILS ABSOLUTE: 0.3 K/CU MM
EOSINOPHILS RELATIVE PERCENT: 2.9 % (ref 0–3)
ERYTHROCYTE SEDIMENTATION RATE: 34 MM/HR (ref 0–20)
HCT VFR BLD CALC: 28.8 % (ref 42–52)
HEMOGLOBIN: 9.2 GM/DL (ref 13.5–18)
IMMATURE NEUTROPHIL %: 0.5 % (ref 0–0.43)
LYMPHOCYTES ABSOLUTE: 0.9 K/CU MM
LYMPHOCYTES RELATIVE PERCENT: 8.2 % (ref 24–44)
MCH RBC QN AUTO: 34.1 PG (ref 27–31)
MCHC RBC AUTO-ENTMCNC: 31.9 % (ref 32–36)
MCV RBC AUTO: 106.7 FL (ref 78–100)
MONOCYTES ABSOLUTE: 0.6 K/CU MM
MONOCYTES RELATIVE PERCENT: 5.6 % (ref 0–4)
NUCLEATED RBC %: 0 %
PDW BLD-RTO: 14.8 % (ref 11.7–14.9)
PLATELET # BLD: 347 K/CU MM (ref 140–440)
PMV BLD AUTO: 8.3 FL (ref 7.5–11.1)
RBC # BLD: 2.7 M/CU MM (ref 4.6–6.2)
SEGMENTED NEUTROPHILS ABSOLUTE COUNT: 9.1 K/CU MM
SEGMENTED NEUTROPHILS RELATIVE PERCENT: 82.4 % (ref 36–66)
TOTAL IMMATURE NEUTOROPHIL: 0.06 K/CU MM
TOTAL NUCLEATED RBC: 0 K/CU MM
WBC # BLD: 11 K/CU MM (ref 4–10.5)

## 2022-10-10 PROCEDURE — 85025 COMPLETE CBC W/AUTO DIFF WBC: CPT

## 2022-10-10 PROCEDURE — 86140 C-REACTIVE PROTEIN: CPT

## 2022-10-10 PROCEDURE — 85652 RBC SED RATE AUTOMATED: CPT

## 2022-10-12 ENCOUNTER — OFFICE VISIT (OUTPATIENT)
Dept: CARDIOLOGY CLINIC | Age: 82
End: 2022-10-12
Payer: MEDICARE

## 2022-10-12 VITALS
HEART RATE: 88 BPM | BODY MASS INDEX: 22.18 KG/M2 | HEIGHT: 74 IN | WEIGHT: 172.8 LBS | OXYGEN SATURATION: 94 % | DIASTOLIC BLOOD PRESSURE: 68 MMHG | SYSTOLIC BLOOD PRESSURE: 112 MMHG

## 2022-10-12 DIAGNOSIS — I38 VHD (VALVULAR HEART DISEASE): Primary | ICD-10-CM

## 2022-10-12 PROCEDURE — 99214 OFFICE O/P EST MOD 30 MIN: CPT | Performed by: INTERNAL MEDICINE

## 2022-10-12 PROCEDURE — 1123F ACP DISCUSS/DSCN MKR DOCD: CPT | Performed by: INTERNAL MEDICINE

## 2022-10-12 NOTE — PROGRESS NOTES
CARDIOLOGY NOTE      10/12/2022    RE: [facility]  ([id number])                                TO:  Dr. Tg Valenzuela MD      New pt      CHIEF COMPLAINT   Chey Aviles is a 81 y.o. male who was seen today for management of  VHD                    Fu on  SAVR             HPI:                   Pt has h/o AR, AS , hyperlipidimea, seen today for  To follow-up. 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    VHD (valvular heart disease) 12/07/2021    Left inguinal hernia 05/16/2017    Hyperlipidemia 01/04/2015    Prostate hypertrophy 01/04/2015    Glucose intolerance (impaired glucose tolerance) 01/04/2015    Erectile dysfunction 01/04/2015     Current Outpatient Medications   Medication Sig Dispense Refill    doxazosin (CARDURA) 4 MG tablet TAKE 1 TABLET AT BEDTIME ASDIRECTED 90 tablet 3    atorvastatin (LIPITOR) 20 MG tablet TAKE 1 TABLET DAILY FOR    CHOLESTEROL 90 tablet 3    Glucose Blood (BLOOD GLUCOSE TEST STRIPS) STRP Uses Freestyle Lite glucometer-tests twice daily or as needed Dx: E11.9 200 strip 3    aspirin EC 81 MG EC tablet Take 81 mg by mouth. Every other day      glucosamine-chondroitin 500-400 MG tablet Take 1 tablet by mouth daily.       cyclobenzaprine (FLEXERIL) 10 MG tablet Take 10 mg by mouth (Patient not taking: Reported on 10/12/2022)      CIALIS 5 MG tablet TAKE 1 TABLET DAILY (Patient not taking: Reported on 10/12/2022) 90 tablet 3    sildenafil (VIAGRA) 100 MG tablet Take 1 tablet by mouth as needed for Erectile Dysfunction (Patient not taking: No sig reported) 15 tablet 3     No current facility-administered medications for this visit. Allergies: Sulfa antibiotics  Past Medical History:   Diagnosis Date    Bladder stones 2017    Dr Jaja Zepeda    Colon polyp     DJD of shoulder     Left shoulder    Family history of prostate cancer     father    Hx of Doppler echocardiogram 2020    EF 50-55% Mild TR, Mod AS. Prostate hypertrophy     Screening colonoscopy 2006    scattered diverticuli, 1 polyp,recommended 5 year follow up per Dr Rosaura Velasquez    VHD (valvular heart disease)      Past Surgical History:   Procedure Laterality Date    INGUINAL HERNIA REPAIR      Right side    INGUINAL HERNIA REPAIR Left 2017    Dr Skylar Engel    LITHOTRIPSY  2017    Cytolitholapaxy with lithotripsy- [address] [facility]  2017    [facility] - Dr Jaja Zepeda    SKIN BIOPSY  3-    facial lesions benign    VASECTOMY        As reviewed   Family History   Problem Relation Age of Onset    Alzheimer's Disease Mother     Prostate Cancer Father         d. age 80 of 'old age'    Coronary Art Dis Father         ASHD- S/P CABG     Social History     Tobacco Use    Smoking status: Former     Types: Cigarettes     Quit date: 1966     Years since quittin.8    Smokeless tobacco: Never    Tobacco comments:     14-pack year history of cigarette smoking   Substance Use Topics    Alcohol use: No     Comment: 1 quart caffeine per day  couple of pops daily      Review of Systems:    Constitutional: Negative for diaphoresis and fatigue  Psychological:Negative for anxiety or depression  HENT: Negative for headaches, nasal congestion, sinus pain or vertigo  Eyes: Negative for visual disturbance.    Endocrine: Negative for polydipsia/polyuria  Respiratory: Negative for shortness of breath  Cardiovascular: Negative for chest pain, dyspnea on exertion, claudication, edema, irregular heartbeat, murmur, palpitations or shortness of breath  Gastrointestinal: Negative for abdominal pain or heartburn  Genito-Urinary: Negative for urinary frequency/urgency  Musculoskeletal: Negative for muscle pain, muscular weakness, negative for pain in arm and leg or swelling in foot and leg  Neurological: Negative for dizziness, headaches, memory loss, numbness/tingling, visual changes, syncope  Dermatological: Negative for rash    Objective:    Vitals:    10/12/22 1403   BP: 112/68   Site: Right Upper Arm   Position: Sitting   Cuff Size: Medium Adult   Pulse: 88   SpO2: 94%   Weight: 172 lb 12.8 oz (78.4 kg)   Height: 6' 2\" (1.88 m)     /68 (Site: Right Upper Arm, Position: Sitting, Cuff Size: Medium Adult)   Pulse 88   Ht 6' 2\" (1.88 m)   Wt 172 lb 12.8 oz (78.4 kg)   SpO2 94%   BMI 22.19 kg/m²     No flowsheet data found. Wt Readings from Last 3 Encounters:   10/12/22 172 lb 12.8 oz (78.4 kg)   09/21/22 171 lb (77.6 kg)   07/16/22 160 lb (72.6 kg)     Body mass index is 22.19 kg/m². GENERAL - Alert, oriented, pleasant, in no apparent distress. EYES: No jaundice, no conjunctival pallor. SKIN: It is warm & dry. No rashes. No Echhymosis    HEENT - No clinically significant abnormalities seen. Neck - Supple. No jugular venous distention noted. No carotid bruits. Cardiovascular - Normal S1 and S2 with 2/6 sosa . Extremities - No cyanosis, clubbing, or significant edema. Pulmonary - No respiratory distress. No wheezes or rales. Abdomen - No masses, tenderness, or organomegaly. Musculoskeletal - No significant edema. No joint deformities. No muscle wasting. Neurologic - Cranial nerves II through XII are grossly intact. There were no gross focal neurologic abnormalities.     Lab Review   Lab Results   Component Value Date/Time    CKTOTAL 186 04/02/2018 08:43 AM    TROPONINT <0.010 07/16/2022 05:17 PM     BNP:  No results found for: BNP  PT/INR:    Lab Results   Component Value Date    INR 1.00 08/02/2017     Lab Results   Component Value Date    LABA1C 5.7 04/02/2018    LABA1C 6.2 2017     Lab Results   Component Value Date    WBC 11.0 (H) 10/10/2022    HCT 28.8 (L) 10/10/2022    .7 (H) 10/10/2022     10/10/2022     Lab Results   Component Value Date    CHOL 133 2018    TRIG 41 2018    HDL 75 (H) 2018    LDLCALC 50 2018     Lab Results   Component Value Date    ALT 30 2022    AST 44 (H) 2022     BMP:    Lab Results   Component Value Date/Time     2022 05:17 PM    K 4.1 2022 05:17 PM     2022 05:17 PM    CO2 25 2022 05:17 PM    BUN 27 2022 05:17 PM    CREATININE 0.9 2022 05:17 PM     CMP:   Lab Results   Component Value Date/Time     2022 05:17 PM    K 4.1 2022 05:17 PM     2022 05:17 PM    CO2 25 2022 05:17 PM    BUN 27 2022 05:17 PM    PROT 6.2 2022 05:17 PM    PROT 6.3 2012 08:02 PM     TSH:    Lab Results   Component Value Date/Time    TSH 1.55 2018 08:43 AM    TSHHS 1.337 2011 09:18 AM        Conclusions      Summary   Left ventricular size is normal, EF is estimated at 45-50%. E/A reversal; indeterminate diastolic function. No regional wall motion abnormalities were detected. Heavily calcified aortic valve with moderate aortic stenosis; mean PmmHG. TAYO: 1.24cm2. DVI: 0.4. Thickening of mitral valve leaflets is noted. Mild mitral and tricuspid regurgitation is present. RVSP is 33 mmHg. No evidence of pericardial effusion. Compared to previous echo in 2019, the mean gradient of the Aortic valve has   increased from 15mmHg to 21mmHg.       Signature      ------------------------------------------------------------------   Electronically signed by Shilpi Yan MD   (Interpreting physician) on 2021 at 11:01 AM   ------------------------------------------------------------------         Assessment & Plan:    -Status post SAVR in [place] last month for infective endocarditis on IV antibiotics now  Patient is feeling much better denies chest pain or shortness of breath  We will get an echocardiogram on the next visit given that the patient recently had an echo      -  LIPID MANAGEMENT:  Importance of lipid levels discussed with patient   and patient was given dietary advice. NCEP- ATP III guidelines reviewed with patient.     -   Changes  in medicines made: No            On lipitor      compliant we will continue with present medical therapy                  - On ATB for IE              -   MO RPLCMT PROST AORTIC VALVE OPEN XCP HOMOGRF/STENT    WITH AORTIC VALVE REPLACEMENT AND    TRANSESOPHAGEAL ECHOCARDIOGRAM ([facility])      Winnie Fagan MD    [facility]

## 2022-10-17 ENCOUNTER — HOSPITAL ENCOUNTER (OUTPATIENT)
Age: 82
Setting detail: SPECIMEN
Discharge: HOME | End: 2022-10-17
Payer: MEDICARE

## 2022-10-17 LAB
BASOPHILS ABSOLUTE: 0.1 K/CU MM
BASOPHILS RELATIVE PERCENT: 1.2 % (ref 0–1)
C-REACTIVE PROTEIN, HIGH SENSITIVITY: 17 MG/L
DIFFERENTIAL TYPE: ABNORMAL
EOSINOPHILS ABSOLUTE: 0.3 K/CU MM
EOSINOPHILS RELATIVE PERCENT: 5 % (ref 0–3)
HCT VFR BLD CALC: 34.5 % (ref 42–52)
HEMOGLOBIN: 10.8 GM/DL (ref 13.5–18)
IMMATURE NEUTROPHIL %: 0.8 % (ref 0–0.43)
LYMPHOCYTES ABSOLUTE: 0.7 K/CU MM
LYMPHOCYTES RELATIVE PERCENT: 11.1 % (ref 24–44)
MCH RBC QN AUTO: 34.1 PG (ref 27–31)
MCHC RBC AUTO-ENTMCNC: 31.3 % (ref 32–36)
MCV RBC AUTO: 108.8 FL (ref 78–100)
MONOCYTES ABSOLUTE: 0.5 K/CU MM
MONOCYTES RELATIVE PERCENT: 8.2 % (ref 0–4)
NUCLEATED RBC %: 0 %
PDW BLD-RTO: 14.6 % (ref 11.7–14.9)
PLATELET # BLD: 425 K/CU MM (ref 140–440)
PMV BLD AUTO: 8.3 FL (ref 7.5–11.1)
RBC # BLD: 3.17 M/CU MM (ref 4.6–6.2)
SEGMENTED NEUTROPHILS ABSOLUTE COUNT: 4.9 K/CU MM
SEGMENTED NEUTROPHILS RELATIVE PERCENT: 73.7 % (ref 36–66)
TOTAL IMMATURE NEUTOROPHIL: 0.05 K/CU MM
TOTAL NUCLEATED RBC: 0 K/CU MM
WBC # BLD: 6.6 K/CU MM (ref 4–10.5)

## 2022-10-17 PROCEDURE — 85025 COMPLETE CBC W/AUTO DIFF WBC: CPT

## 2022-10-17 PROCEDURE — 86140 C-REACTIVE PROTEIN: CPT

## 2022-10-17 PROCEDURE — 85652 RBC SED RATE AUTOMATED: CPT

## 2022-10-18 LAB — ERYTHROCYTE SEDIMENTATION RATE: 35 MM/HR (ref 0–20)

## 2023-01-13 ENCOUNTER — HOSPITAL ENCOUNTER (OUTPATIENT)
Dept: CARDIAC REHAB | Age: 83
Setting detail: THERAPIES SERIES
Discharge: HOME | End: 2023-01-13
Payer: MEDICARE

## 2023-01-13 PROCEDURE — G0423 INTENS CARDIAC REHAB NO EXER: HCPCS

## 2023-01-13 PROCEDURE — G0422 INTENS CARDIAC REHAB W/EXERC: HCPCS

## 2023-01-18 ENCOUNTER — HOSPITAL ENCOUNTER (OUTPATIENT)
Dept: CARDIAC REHAB | Age: 83
Setting detail: THERAPIES SERIES
Discharge: HOME | End: 2023-01-18
Payer: MEDICARE

## 2023-01-18 PROCEDURE — G0422 INTENS CARDIAC REHAB W/EXERC: HCPCS

## 2023-01-20 ENCOUNTER — HOSPITAL ENCOUNTER (OUTPATIENT)
Dept: CARDIAC REHAB | Age: 83
Setting detail: THERAPIES SERIES
Discharge: HOME | End: 2023-01-20
Payer: MEDICARE

## 2023-01-20 PROCEDURE — G0422 INTENS CARDIAC REHAB W/EXERC: HCPCS

## 2023-01-25 ENCOUNTER — HOSPITAL ENCOUNTER (OUTPATIENT)
Dept: CARDIAC REHAB | Age: 83
Setting detail: THERAPIES SERIES
Discharge: HOME | End: 2023-01-25
Payer: MEDICARE

## 2023-01-25 PROCEDURE — G0422 INTENS CARDIAC REHAB W/EXERC: HCPCS

## 2023-01-27 ENCOUNTER — HOSPITAL ENCOUNTER (OUTPATIENT)
Dept: CARDIAC REHAB | Age: 83
Setting detail: THERAPIES SERIES
Discharge: HOME | End: 2023-01-27
Payer: MEDICARE

## 2023-01-27 PROCEDURE — G0422 INTENS CARDIAC REHAB W/EXERC: HCPCS

## 2023-01-30 ENCOUNTER — HOSPITAL ENCOUNTER (OUTPATIENT)
Dept: CARDIAC REHAB | Age: 83
Setting detail: THERAPIES SERIES
Discharge: HOME | End: 2023-01-30
Payer: MEDICARE

## 2023-01-30 PROCEDURE — G0422 INTENS CARDIAC REHAB W/EXERC: HCPCS

## 2023-01-31 ENCOUNTER — PROCEDURE VISIT (OUTPATIENT)
Dept: CARDIOLOGY CLINIC | Age: 83
End: 2023-01-31
Payer: MEDICARE

## 2023-01-31 ENCOUNTER — OFFICE VISIT (OUTPATIENT)
Dept: CARDIOLOGY CLINIC | Age: 83
End: 2023-01-31
Payer: MEDICARE

## 2023-01-31 VITALS
BODY MASS INDEX: 22.84 KG/M2 | WEIGHT: 178 LBS | DIASTOLIC BLOOD PRESSURE: 64 MMHG | HEIGHT: 74 IN | SYSTOLIC BLOOD PRESSURE: 108 MMHG | HEART RATE: 88 BPM

## 2023-01-31 DIAGNOSIS — I38 VHD (VALVULAR HEART DISEASE): ICD-10-CM

## 2023-01-31 DIAGNOSIS — E78.2 MIXED HYPERLIPIDEMIA: ICD-10-CM

## 2023-01-31 DIAGNOSIS — I38 VHD (VALVULAR HEART DISEASE): Primary | ICD-10-CM

## 2023-01-31 LAB
LV EF: 45 %
LVEF MODALITY: NORMAL

## 2023-01-31 PROCEDURE — 1123F ACP DISCUSS/DSCN MKR DOCD: CPT | Performed by: NURSE PRACTITIONER

## 2023-01-31 PROCEDURE — 99214 OFFICE O/P EST MOD 30 MIN: CPT | Performed by: NURSE PRACTITIONER

## 2023-01-31 PROCEDURE — 93306 TTE W/DOPPLER COMPLETE: CPT | Performed by: INTERNAL MEDICINE

## 2023-01-31 RX ORDER — ATORVASTATIN CALCIUM 40 MG/1
TABLET, FILM COATED ORAL
COMMUNITY
Start: 2022-12-25

## 2023-01-31 ASSESSMENT — ENCOUNTER SYMPTOMS
ORTHOPNEA: 0
SHORTNESS OF BREATH: 0

## 2023-01-31 NOTE — PROGRESS NOTES
1/31/2023  Primary cardiologist: Dr. Celso Bruner:   Henri Hammonds  is an established 81 y.o.  male here for a follow-up valvular heart disease,  AS, hyperlipidemia and  mildly reduced left ventricular systolic function      SUBJECTIVE/OBJECTIVE:  Henri Hammonds is a 81 y.o. male with a history of valvular heart disease, infective endocarditis status post SAVR, hyperlipidemia,  diabetes mellitus type 2 and BPH    In September 2022 underwent bioprosthetic aortic valve replacement due to infective endocarditis with moderate to severe AS/moderate AI    HPI :   Henri Hammonds reports he is doing well. His shortness of breath has resolved. Energy level was picking up as well. He is currently attending cardiac rehab. Monitoring blood sugar at home with fasting blood sugar around 115. Review of Systems   Constitutional: Negative for diaphoresis and malaise/fatigue. Cardiovascular:  Negative for chest pain, claudication, dyspnea on exertion, irregular heartbeat, leg swelling, near-syncope, orthopnea, palpitations and paroxysmal nocturnal dyspnea. Respiratory:  Negative for shortness of breath. Neurological:  Negative for dizziness and light-headedness. Vitals:    01/31/23 0914   BP: 108/64   Pulse: 88   Weight: 178 lb (80.7 kg)   Height: 6' 2\" (1.88 m)     No flowsheet data found. Wt Readings from Last 3 Encounters:   01/31/23 178 lb (80.7 kg)   10/12/22 172 lb 12.8 oz (78.4 kg)   09/21/22 171 lb (77.6 kg)     Body mass index is 22.85 kg/m². Physical Exam  HENT:      Mouth/Throat:      Mouth: Mucous membranes are moist.   Cardiovascular:      Rate and Rhythm: Normal rate and regular rhythm. Pulses: Normal pulses. Heart sounds: Normal heart sounds. No murmur heard. Comments: Varicosities to lower legs  Pulmonary:      Effort: Pulmonary effort is normal.      Breath sounds: Normal breath sounds. No wheezing or rales. Abdominal:      General: There is no distension. Tenderness:  There is no abdominal tenderness. Musculoskeletal:      Right lower leg: No edema. Left lower leg: No edema. Skin:     General: Skin is warm and dry. Capillary Refill: Capillary refill takes less than 2 seconds. Neurological:      Mental Status: He is alert and oriented to person, place, and time. Psychiatric:         Mood and Affect: Mood normal.         Behavior: Behavior normal.              Current Outpatient Medications   Medication Sig Dispense Refill    metoprolol tartrate (LOPRESSOR) 25 MG tablet       atorvastatin (LIPITOR) 40 MG tablet       CIALIS 5 MG tablet TAKE 1 TABLET DAILY 90 tablet 3    doxazosin (CARDURA) 4 MG tablet TAKE 1 TABLET AT BEDTIME ASDIRECTED 90 tablet 3    Glucose Blood (BLOOD GLUCOSE TEST STRIPS) STRP Uses Freestyle Lite glucometer-tests twice daily or as needed Dx: E11.9 200 strip 3    aspirin EC 81 MG EC tablet Take 81 mg by mouth. Every other day      glucosamine-chondroitin 500-400 MG tablet Take 1 tablet by mouth daily. cyclobenzaprine (FLEXERIL) 10 MG tablet Take 10 mg by mouth (Patient not taking: No sig reported)       No current facility-administered medications for this visit. All pertinent data reviewed and discussed with patient    Per Evaluation of chart, including care everywhere, unable to find coronary angiogram report. ASSESSMENT/PLAN:    valvular heart disease  Status post SAVR for bacterial endocarditis and AS/AI  Per surgical note no bypasses done  Doing well. Recovering nicely. We will check echocardiogram to assess valvular function along with EF as he previously was noted to have mildly depressed systolic function. Hyperlipidemia   no lipids available. We will check with primary care for copy. - if done: If none available will give lab slip  Currently on atorvastatin which will be continued.       Tests ordered: Echocardiogram  Follow-up 6 months    Signed:  ALY Mario CNP, 1/31/2023, 9:16 AM    An electronic signature was used to authenticate this note. Please note this report has been partially produced using speech recognition software and may contain errors related to that system including errors in grammar, punctuation, and spelling, as well as words and phrases that may be inappropriate. If there are any questions or concerns please feel free to contact the dictating provider for clarification.

## 2023-02-01 ENCOUNTER — HOSPITAL ENCOUNTER (OUTPATIENT)
Dept: CARDIAC REHAB | Age: 83
Setting detail: THERAPIES SERIES
Discharge: HOME | End: 2023-02-01
Payer: MEDICARE

## 2023-02-01 ENCOUNTER — TELEPHONE (OUTPATIENT)
Dept: CARDIOLOGY CLINIC | Age: 83
End: 2023-02-01

## 2023-02-01 ENCOUNTER — HOSPITAL ENCOUNTER (OUTPATIENT)
Dept: CARDIAC REHAB | Age: 83
Setting detail: THERAPIES SERIES
End: 2023-02-01
Payer: MEDICARE

## 2023-02-01 PROCEDURE — G0422 INTENS CARDIAC REHAB W/EXERC: HCPCS

## 2023-02-01 NOTE — TELEPHONE ENCOUNTER
Called and spoke with pt about echo results- stable echo - need yearly echo to follow VHD       Pt will schedule echo at next appt.

## 2023-02-03 ENCOUNTER — APPOINTMENT (OUTPATIENT)
Dept: CARDIAC REHAB | Age: 83
End: 2023-02-03
Payer: MEDICARE

## 2023-02-03 ENCOUNTER — HOSPITAL ENCOUNTER (OUTPATIENT)
Dept: CARDIAC REHAB | Age: 83
Setting detail: THERAPIES SERIES
Discharge: HOME | End: 2023-02-03
Payer: MEDICARE

## 2023-02-03 PROCEDURE — G0422 INTENS CARDIAC REHAB W/EXERC: HCPCS

## 2023-02-06 ENCOUNTER — HOSPITAL ENCOUNTER (OUTPATIENT)
Dept: CARDIAC REHAB | Age: 83
Setting detail: THERAPIES SERIES
Discharge: HOME | End: 2023-02-06
Payer: MEDICARE

## 2023-02-06 PROCEDURE — G0422 INTENS CARDIAC REHAB W/EXERC: HCPCS

## 2023-02-08 ENCOUNTER — HOSPITAL ENCOUNTER (OUTPATIENT)
Dept: CARDIAC REHAB | Age: 83
Setting detail: THERAPIES SERIES
Discharge: HOME | End: 2023-02-08
Payer: MEDICARE

## 2023-02-08 PROCEDURE — G0422 INTENS CARDIAC REHAB W/EXERC: HCPCS

## 2023-02-10 ENCOUNTER — HOSPITAL ENCOUNTER (OUTPATIENT)
Dept: CARDIAC REHAB | Age: 83
Setting detail: THERAPIES SERIES
Discharge: HOME | End: 2023-02-10
Payer: MEDICARE

## 2023-02-10 PROCEDURE — G0422 INTENS CARDIAC REHAB W/EXERC: HCPCS

## 2023-02-13 ENCOUNTER — APPOINTMENT (OUTPATIENT)
Dept: CARDIAC REHAB | Age: 83
End: 2023-02-13
Payer: MEDICARE

## 2023-02-13 ENCOUNTER — HOSPITAL ENCOUNTER (OUTPATIENT)
Dept: CARDIAC REHAB | Age: 83
Setting detail: THERAPIES SERIES
Discharge: HOME | End: 2023-02-13
Payer: MEDICARE

## 2023-02-13 PROCEDURE — G0422 INTENS CARDIAC REHAB W/EXERC: HCPCS

## 2023-02-15 ENCOUNTER — HOSPITAL ENCOUNTER (OUTPATIENT)
Dept: CARDIAC REHAB | Age: 83
Setting detail: THERAPIES SERIES
Discharge: HOME | End: 2023-02-15
Payer: MEDICARE

## 2023-02-15 ENCOUNTER — APPOINTMENT (OUTPATIENT)
Dept: CARDIAC REHAB | Age: 83
End: 2023-02-15
Payer: MEDICARE

## 2023-02-15 PROCEDURE — G0422 INTENS CARDIAC REHAB W/EXERC: HCPCS

## 2023-02-17 ENCOUNTER — APPOINTMENT (OUTPATIENT)
Dept: CARDIAC REHAB | Age: 83
End: 2023-02-17
Payer: MEDICARE

## 2023-02-22 ENCOUNTER — APPOINTMENT (OUTPATIENT)
Dept: CARDIAC REHAB | Age: 83
End: 2023-02-22
Payer: MEDICARE

## 2023-02-24 ENCOUNTER — APPOINTMENT (OUTPATIENT)
Dept: CARDIAC REHAB | Age: 83
End: 2023-02-24
Payer: MEDICARE

## 2023-02-27 ENCOUNTER — APPOINTMENT (OUTPATIENT)
Dept: CARDIAC REHAB | Age: 83
End: 2023-02-27
Payer: MEDICARE

## 2023-07-17 ENCOUNTER — OFFICE VISIT (OUTPATIENT)
Dept: CARDIOLOGY CLINIC | Age: 83
End: 2023-07-17
Payer: MEDICARE

## 2023-07-17 VITALS
BODY MASS INDEX: 22.1 KG/M2 | HEIGHT: 74 IN | DIASTOLIC BLOOD PRESSURE: 62 MMHG | WEIGHT: 172.2 LBS | SYSTOLIC BLOOD PRESSURE: 100 MMHG | HEART RATE: 79 BPM

## 2023-07-17 DIAGNOSIS — I38 VHD (VALVULAR HEART DISEASE): Primary | ICD-10-CM

## 2023-07-17 PROCEDURE — 99214 OFFICE O/P EST MOD 30 MIN: CPT | Performed by: INTERNAL MEDICINE

## 2023-07-17 PROCEDURE — 1123F ACP DISCUSS/DSCN MKR DOCD: CPT | Performed by: INTERNAL MEDICINE

## 2023-07-17 RX ORDER — LISINOPRIL 5 MG/1
5 TABLET ORAL DAILY
COMMUNITY

## 2023-07-17 NOTE — PROGRESS NOTES
CARDIOLOGY NOTE      7/17/2023    RE: Tejal Colmenares  (1940)                                TO:  Dr. Eliz Staley MD      New pt      CHIEF COMPLAINT   Chapo Pablo is a 81 y.o. male who was seen today for management of  VHD                    Fu on  SAVR             HPI:                   Pt has h/o AR, AS , SAVR,hyperlipidimea, seen today for  To follow-up. Tejal Colmenares has the following history recorded in care path:  Patient Active Problem List    Diagnosis Date Noted    Bacterial endocarditis 09/21/2022    VHD (valvular heart disease) 12/07/2021    Left inguinal hernia 05/16/2017    Hyperlipidemia 01/04/2015    Prostate hypertrophy 01/04/2015    Glucose intolerance (impaired glucose tolerance) 01/04/2015    Erectile dysfunction 01/04/2015     Current Outpatient Medications   Medication Sig Dispense Refill    lisinopril (PRINIVIL;ZESTRIL) 5 MG tablet Take 1 tablet by mouth daily      metFORMIN (GLUCOPHAGE) 500 MG tablet       atorvastatin (LIPITOR) 40 MG tablet       CIALIS 5 MG tablet TAKE 1 TABLET DAILY 90 tablet 3    doxazosin (CARDURA) 4 MG tablet TAKE 1 TABLET AT BEDTIME ASDIRECTED 90 tablet 3    Glucose Blood (BLOOD GLUCOSE TEST STRIPS) STRP Uses Freestyle Lite glucometer-tests twice daily or as needed Dx: E11.9 200 strip 3    aspirin EC 81 MG EC tablet Take 1 tablet by mouth Every other day      glucosamine-chondroitin 500-400 MG tablet Take 1 tablet by mouth daily      metoprolol tartrate (LOPRESSOR) 25 MG tablet  (Patient not taking: Reported on 7/17/2023)      cyclobenzaprine (FLEXERIL) 10 MG tablet Take 10 mg by mouth (Patient not taking: No sig reported)       No current facility-administered medications for this visit.      Allergies: Sulfa antibiotics  Past Medical History:   Diagnosis Date    Bladder stones 08/2017    Dr Ky Santana    Colon polyp     DJD of shoulder     Left shoulder    Family history of prostate cancer     father    Hx of Doppler echocardiogram 11/01/2020    EF 50-55%

## 2023-07-17 NOTE — PATIENT INSTRUCTIONS
We are committed to providing you the best care possible. If you receive a survey after visiting one of our offices, please take time to share your experience concerning your physician office visit. These surveys are confidential and no health information about you is shared. We are eager to improve for you and we are counting on your feedback to help make that happen. **It is YOUR responsibilty to bring medication bottles and/or updated medication list to [address]. This will allow us to better serve you and all your healthcare needs**    Thank you for allowing us to care for you today! We want to ensure we can follow your treatment plan and we strive to give you the best outcomes and experience possible. If you ever have a life threatening emergency and call 911 - for an ambulance (EMS)   Our providers can only care for you at:   [facility] or [facility]. Even if you have someone take you or you drive yourself we can only care for you in a [facility]. Our providers are not setup at the other healthcare locations!

## 2023-07-18 ENCOUNTER — TELEPHONE (OUTPATIENT)
Dept: CARDIOLOGY CLINIC | Age: 83
End: 2023-07-18

## 2023-07-18 NOTE — TELEPHONE ENCOUNTER
[address] called to see if patient needs premedicated for a cleaning ,He told them he had a valve replaced appt 8/23

## 2024-02-12 ENCOUNTER — OFFICE VISIT (OUTPATIENT)
Dept: CARDIOLOGY CLINIC | Age: 84
End: 2024-02-12
Payer: MEDICARE

## 2024-02-12 VITALS
WEIGHT: 173 LBS | BODY MASS INDEX: 22.2 KG/M2 | HEIGHT: 74 IN | HEART RATE: 85 BPM | DIASTOLIC BLOOD PRESSURE: 64 MMHG | OXYGEN SATURATION: 96 % | SYSTOLIC BLOOD PRESSURE: 100 MMHG

## 2024-02-12 DIAGNOSIS — I38 VHD (VALVULAR HEART DISEASE): Primary | ICD-10-CM

## 2024-02-12 PROCEDURE — G8420 CALC BMI NORM PARAMETERS: HCPCS | Performed by: INTERNAL MEDICINE

## 2024-02-12 PROCEDURE — G8427 DOCREV CUR MEDS BY ELIG CLIN: HCPCS | Performed by: INTERNAL MEDICINE

## 2024-02-12 PROCEDURE — 1123F ACP DISCUSS/DSCN MKR DOCD: CPT | Performed by: INTERNAL MEDICINE

## 2024-02-12 PROCEDURE — 1036F TOBACCO NON-USER: CPT | Performed by: INTERNAL MEDICINE

## 2024-02-12 PROCEDURE — 93000 ELECTROCARDIOGRAM COMPLETE: CPT | Performed by: INTERNAL MEDICINE

## 2024-02-12 PROCEDURE — 99214 OFFICE O/P EST MOD 30 MIN: CPT | Performed by: INTERNAL MEDICINE

## 2024-02-12 PROCEDURE — G8484 FLU IMMUNIZE NO ADMIN: HCPCS | Performed by: INTERNAL MEDICINE

## 2024-02-12 RX ORDER — LISINOPRIL 5 MG/1
5 TABLET ORAL DAILY
Qty: 90 TABLET | Refills: 3 | Status: SHIPPED | OUTPATIENT
Start: 2024-02-12 | End: 2024-02-12 | Stop reason: SDUPTHER

## 2024-02-12 RX ORDER — LISINOPRIL 5 MG/1
2.5 TABLET ORAL DAILY
Qty: 90 TABLET | Refills: 0 | Status: SHIPPED | OUTPATIENT
Start: 2024-02-12

## 2024-02-12 RX ORDER — LISINOPRIL 5 MG/1
5 TABLET ORAL DAILY
Qty: 90 TABLET | Refills: 0 | Status: SHIPPED | OUTPATIENT
Start: 2024-02-12 | End: 2024-02-12 | Stop reason: SDUPTHER

## 2024-02-12 NOTE — PROGRESS NOTES
CARDIOLOGY NOTE      2/12/2024    RE: Kade Randall  (1940)                                TO:  Marcio Becerra MD      New pt      CHIEF COMPLAINT   Kade is a 83 y.o. male who was seen today for management of  VHD                      Here for follow-up         HPI:                   Pt has h/o AR, AS , SAVR,hyperlipidimea, seen today for  To follow-up.  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/21/2022    VHD (valvular heart disease) 12/07/2021    Left inguinal hernia 05/16/2017    Hyperlipidemia 01/04/2015    Prostate hypertrophy 01/04/2015    Glucose intolerance (impaired glucose tolerance) 01/04/2015    Erectile dysfunction 01/04/2015     Current Outpatient Medications   Medication Sig Dispense Refill    lisinopril (PRINIVIL;ZESTRIL) 5 MG tablet Take 1 tablet by mouth daily      metFORMIN (GLUCOPHAGE) 500 MG tablet       atorvastatin (LIPITOR) 40 MG tablet       CIALIS 5 MG tablet TAKE 1 TABLET DAILY 90 tablet 3    doxazosin (CARDURA) 4 MG tablet TAKE 1 TABLET AT BEDTIME ASDIRECTED 90 tablet 3    aspirin EC 81 MG EC tablet Take 1 tablet by mouth Every other day      glucosamine-chondroitin 500-400 MG tablet Take 1 tablet by mouth daily      metoprolol tartrate (LOPRESSOR) 25 MG tablet  (Patient not taking: Reported on 7/17/2023)      cyclobenzaprine (FLEXERIL) 10 MG tablet Take 10 mg by mouth (Patient not taking: No sig reported)      Glucose Blood (BLOOD GLUCOSE TEST STRIPS) STRP Uses Freestyle Lite glucometer-tests twice daily or as needed Dx: E11.9 200 strip 3     No current facility-administered medications for this visit.     Allergies: Sulfa antibiotics  Past Medical History:   Diagnosis Date    Bladder stones 08/2017    Dr Candelario    Colon polyp     DJD of shoulder     Left shoulder    Family history of prostate cancer     father    Hx of Doppler echocardiogram 11/01/2020    EF 50-55%

## 2024-02-26 ENCOUNTER — TELEPHONE (OUTPATIENT)
Dept: CARDIOLOGY CLINIC | Age: 84
End: 2024-02-26

## 2024-02-26 NOTE — TELEPHONE ENCOUNTER
Left message of normal results.       Left Ventricle: Normal left ventricular systolic function with a visually estimated EF of 55 - 60%. Left ventricle size is normal. Normal wall thickness. Normal wall motion. Normal diastolic function.    Aortic Valve: Porcine bioprosthetic valve. AV mean gradient is 21 mmHg.    Mitral Valve: Mild regurgitation.    Tricuspid Valve: Mildly elevated RVSP, consistent with mild pulmonary hypertension. The estimated RVSP is 39 mmHg.    Image quality is adequate.

## 2024-04-30 ENCOUNTER — TELEPHONE (OUTPATIENT)
Dept: CARDIOLOGY CLINIC | Age: 84
End: 2024-04-30

## 2024-04-30 RX ORDER — LISINOPRIL 2.5 MG/1
2.5 TABLET ORAL DAILY
COMMUNITY

## 2024-04-30 NOTE — TELEPHONE ENCOUNTER
Patient calling to let us know that on his Lisinopril 5mgs, he will not be taking it.  Dr. Espino ordered him 2.5 mgs.  Patient has been taking this for quite awhile.  Patient would like to keep this at 2.5 mgs..  Patient is asking for this to be removed from our office.  He will take the 2.5 mgs that was ordered by Dr. Arias orginally.

## 2024-08-02 ENCOUNTER — OFFICE VISIT (OUTPATIENT)
Dept: CARDIOLOGY CLINIC | Age: 84
End: 2024-08-02
Payer: MEDICARE

## 2024-08-02 VITALS
BODY MASS INDEX: 21.56 KG/M2 | HEIGHT: 74 IN | WEIGHT: 168 LBS | DIASTOLIC BLOOD PRESSURE: 60 MMHG | HEART RATE: 80 BPM | SYSTOLIC BLOOD PRESSURE: 106 MMHG

## 2024-08-02 DIAGNOSIS — I38 VHD (VALVULAR HEART DISEASE): Primary | ICD-10-CM

## 2024-08-02 PROCEDURE — 1123F ACP DISCUSS/DSCN MKR DOCD: CPT | Performed by: INTERNAL MEDICINE

## 2024-08-02 PROCEDURE — G8420 CALC BMI NORM PARAMETERS: HCPCS | Performed by: INTERNAL MEDICINE

## 2024-08-02 PROCEDURE — 1036F TOBACCO NON-USER: CPT | Performed by: INTERNAL MEDICINE

## 2024-08-02 PROCEDURE — G8427 DOCREV CUR MEDS BY ELIG CLIN: HCPCS | Performed by: INTERNAL MEDICINE

## 2024-08-02 PROCEDURE — 99214 OFFICE O/P EST MOD 30 MIN: CPT | Performed by: INTERNAL MEDICINE

## 2024-08-02 NOTE — PROGRESS NOTES
Valve: Mild regurgitation.    Tricuspid Valve: Mildly elevated RVSP, consistent with mild pulmonary hypertension. The estimated RVSP is 39 mmHg.    Image quality is adequate.     -  LIPID MANAGEMENT:  Importance of lipid levels discussed with patient   and patient was given dietary advice. NCEP- ATP III guidelines reviewed with patient.    -   Changes  in medicines made: No            On lipitor      compliant we will continue with present medical therapy              -   DIABETES MELLITUS: Available pertinent lab data reviewed   and  patient was given dietary advice . Advised to check blood glucose level on a regular basis.      -   Changes  in medicines made: No          On metformin compliant we will check hemoglobin A1c    -  Hypertension: Patients blood pressure is normal. Patient is advised about low sodium diet. Present medical regimen will not be changed.    On stable compliant we will continue to monitor                 -   CT RPLCMT PROST AORTIC VALVE OPEN XCP HOMOGRF/STENT    WITH AORTIC VALVE REPLACEMENT AND    TRANSESOPHAGEAL ECHOCARDIOGRAM (KVNG)      JUAN M NAYAK MD    [facility]

## 2024-12-13 ENCOUNTER — TELEPHONE (OUTPATIENT)
Dept: CARDIOLOGY CLINIC | Age: 84
End: 2024-12-13

## 2024-12-13 NOTE — TELEPHONE ENCOUNTER
Cardiologist: Dr. Navarro  Surgeon: Dr. Ogden  Surgery: Cystolitholapaxy  Surgery/Procedure should be done in the hospital setting    _____ yes    _____  no    Anesthesia: General  Date: 1/2/2025  Fax# [phone]  # [phone]    Last OV 8/2/2024 w/Ramon     LIPID MANAGEMENT:  Importance of lipid levels discussed with patient   and patient was given dietary advice. NCEP- ATP III guidelines reviewed with patient.    -   Changes  in medicines made: No            On lipitor      compliant we will continue with present medical therapy         -   DIABETES MELLITUS: Available pertinent lab data reviewed   and  patient was given dietary advice . Advised to check blood glucose level on a regular basis.      -   Changes  in medicines made: No          On metformin compliant we will check hemoglobin A1c     -  Hypertension: Patients blood pressure is normal. Patient is advised about low sodium diet. Present medical regimen will not be changed.    On stable compliant we will continue to monitor   -   AK RPLCMT PROST AORTIC VALVE OPEN XCP HOMOGRF/STENT    WITH AORTIC VALVE REPLACEMENT AND    TRANSESOPHAGEAL ECHOCARDIOGRAM (KVNG)        Last EKG- 2/12/2024      NM- 12/7/2021  Small sized defect of mild severity which is reversible involving septal   wall of myocardium. Abnormal Stress nuclear scintigraphic study suggestive   of abnormal myocardial perfusion. Mild degree of septal wall ischemia noted   involving a small sized area of left ventricular myocardium. Gated images   demonstrate normal left ventricular systolic function with EF of 55 %.      Echo- 2/23/2024    Left Ventricle: Normal left ventricular systolic function with a visually estimated EF of 55 - 60%. Left ventricle size is normal. Normal wall thickness. Normal wall motion. Normal diastolic function.    Aortic Valve: Porcine bioprosthetic valve. AV mean gradient is 21 mmHg.    Mitral Valve: Mild regurgitation.    Tricuspid Valve: Mildly elevated RVSP,

## 2024-12-19 RX ORDER — CHOLECALCIFEROL (VITAMIN D3) 1250 MCG
CAPSULE ORAL
COMMUNITY

## 2024-12-19 NOTE — PROGRESS NOTES
Surgery @ [facility] on 1/2/25 you will be called on 12/31/24 with a surgery time     NOTHING TO EAT OR DRINK AFTER MIDNIGHT DAY OF SURGERY    1. Enter thru the hospital main entrance on day of surgery, check in at the Information Desk. If you arrive prior to 6:00am, enter thru the ER entrance.    2. Follow the directions as prescribed by the doctor for your procedure and medications.         Morning of surgery take: No Medications. Hold Metformin DOS, Hold ASA per Dr Candelario.          Stop vitamins, supplements and NSAIDS:  12/26/24    3. Check with your Doctor regarding stopping blood thinners and follow their instructions.    4. Do not smoke, vape or use chewing tobacco morning of surgery. Do not drink any alcoholic beverages 24 hours prior to surgery.       This includes NA Beer. No street drugs 7 days prior to surgery.    5. If you have dentures, contacts of glasses they will be removed before going to the OR; please bring a case.    6. Please bring picture ID, insurance card, paperwork from the doctor’s office (H & P, Consent, & card for implantable devices).    7. Take a shower with an antibacterial soap the night before surgery and the morning of surgery. Do not put anything on your skin      After your morning shower.    8. You will need a responsible adult to drive you home and check on you after surgery.

## 2024-12-31 ENCOUNTER — ANESTHESIA EVENT (OUTPATIENT)
Dept: OPERATING ROOM | Age: 84
End: 2024-12-31
Payer: MEDICARE

## 2024-12-31 NOTE — PROGRESS NOTES
Notified patient surgery @ [facility] on 1/2/25@ 0730, arrival 0600. NPO status and morning medications reviewed. Understanding verbalized.

## 2024-12-31 NOTE — ANESTHESIA PRE PROCEDURE
Department of Anesthesiology  Preprocedure Note       Name:  Kade Randall   Age:  84 y.o.  :  1940                                          MRN:  [id number]         Date:  2024      Surgeon: Surgeon(s):  Ana M Candelario MD    Procedure: Procedure(s):  CYSTOSCOPY LITHOLAPAXY    Medications prior to admission:   Prior to Admission medications    Medication Sig Start Date End Date Taking? Authorizing Provider   Cholecalciferol (VITAMIN D3) 1.25 MG (76848 UT) CAPS Take by mouth   Yes Elías Farr MD   lisinopril (PRINIVIL;ZESTRIL) 2.5 MG tablet Take 1 tablet by mouth daily    Elías Farr MD   metFORMIN (GLUCOPHAGE) 500 MG tablet  7/3/23   Elías Farr MD   metoprolol tartrate (LOPRESSOR) 25 MG tablet  22   Elías Farr MD   atorvastatin (LIPITOR) 40 MG tablet  22   Elías Farr MD   cyclobenzaprine (FLEXERIL) 10 MG tablet Take 10 mg by mouth  Patient not taking: Reported on 10/12/2022 7/14/22   Elías Farr MD   CIALIS 5 MG tablet TAKE 1 TABLET DAILY 18   Anuel Mesa MD   doxazosin (CARDURA) 4 MG tablet TAKE 1 TABLET AT BEDTIME ASDIRECTED 18   Anuel Mesa MD   Glucose Blood (BLOOD GLUCOSE TEST STRIPS) STRP Uses Freestyle Lite glucometer-tests twice daily or as needed Dx: E11.9 1/15/18   Anuel Mesa MD   aspirin EC 81 MG EC tablet Take 1 tablet by mouth Every other day    Elías Farr MD   glucosamine-chondroitin 500-400 MG tablet Take 1 tablet by mouth daily    Elías Farr MD       Current medications:    No current facility-administered medications for this encounter.     Current Outpatient Medications   Medication Sig Dispense Refill   • Cholecalciferol (VITAMIN D3) 1.25 MG (87762 UT) CAPS Take by mouth     • lisinopril (PRINIVIL;ZESTRIL) 2.5 MG tablet Take 1 tablet by mouth daily     • metFORMIN (GLUCOPHAGE) 500 MG tablet      • metoprolol tartrate (LOPRESSOR) 25 MG tablet

## 2025-01-02 ENCOUNTER — ANESTHESIA (OUTPATIENT)
Dept: OPERATING ROOM | Age: 85
End: 2025-01-02
Payer: MEDICARE

## 2025-01-02 ENCOUNTER — HOSPITAL ENCOUNTER (OUTPATIENT)
Age: 85
Setting detail: OUTPATIENT SURGERY
Discharge: HOME | End: 2025-01-02
Attending: SPECIALIST | Admitting: SPECIALIST
Payer: MEDICARE

## 2025-01-02 VITALS
SYSTOLIC BLOOD PRESSURE: 139 MMHG | HEIGHT: 74 IN | WEIGHT: 164 LBS | DIASTOLIC BLOOD PRESSURE: 72 MMHG | OXYGEN SATURATION: 93 % | TEMPERATURE: 97.6 F | HEART RATE: 74 BPM | BODY MASS INDEX: 21.05 KG/M2 | RESPIRATION RATE: 18 BRPM

## 2025-01-02 DIAGNOSIS — R31.0 GROSS HEMATURIA: ICD-10-CM

## 2025-01-02 LAB
GLUCOSE BLD-MCNC: 123 MG/DL (ref 74–99)
GLUCOSE BLD-MCNC: 141 MG/DL (ref 74–99)

## 2025-01-02 PROCEDURE — 6360000002 HC RX W HCPCS: Performed by: SPECIALIST

## 2025-01-02 PROCEDURE — 7100000010 HC PHASE II RECOVERY - FIRST 15 MIN: Performed by: SPECIALIST

## 2025-01-02 PROCEDURE — 88300 SURGICAL PATH GROSS: CPT | Performed by: PATHOLOGY

## 2025-01-02 PROCEDURE — 7100000001 HC PACU RECOVERY - ADDTL 15 MIN: Performed by: SPECIALIST

## 2025-01-02 PROCEDURE — 82962 GLUCOSE BLOOD TEST: CPT

## 2025-01-02 PROCEDURE — 2500000003 HC RX 250 WO HCPCS: Performed by: SPECIALIST

## 2025-01-02 PROCEDURE — 2709999900 HC NON-CHARGEABLE SUPPLY: Performed by: SPECIALIST

## 2025-01-02 PROCEDURE — 3600000005 HC SURGERY LEVEL 5 BASE: Performed by: SPECIALIST

## 2025-01-02 PROCEDURE — 2580000003 HC RX 258: Performed by: ANESTHESIOLOGY

## 2025-01-02 PROCEDURE — 6360000002 HC RX W HCPCS

## 2025-01-02 PROCEDURE — 82365 CALCULUS SPECTROSCOPY: CPT

## 2025-01-02 PROCEDURE — 3600000015 HC SURGERY LEVEL 5 ADDTL 15MIN: Performed by: SPECIALIST

## 2025-01-02 PROCEDURE — 3700000000 HC ANESTHESIA ATTENDED CARE: Performed by: SPECIALIST

## 2025-01-02 PROCEDURE — 2720000010 HC SURG SUPPLY STERILE: Performed by: SPECIALIST

## 2025-01-02 PROCEDURE — 7100000000 HC PACU RECOVERY - FIRST 15 MIN: Performed by: SPECIALIST

## 2025-01-02 PROCEDURE — 3700000001 HC ADD 15 MINUTES (ANESTHESIA): Performed by: SPECIALIST

## 2025-01-02 PROCEDURE — 7100000011 HC PHASE II RECOVERY - ADDTL 15 MIN: Performed by: SPECIALIST

## 2025-01-02 RX ORDER — HYDRALAZINE HYDROCHLORIDE 20 MG/ML
10 INJECTION INTRAMUSCULAR; INTRAVENOUS
Status: DISCONTINUED | OUTPATIENT
Start: 2025-01-02 | End: 2025-01-02 | Stop reason: HOSPADM

## 2025-01-02 RX ORDER — DEXAMETHASONE SODIUM PHOSPHATE 4 MG/ML
INJECTION, SOLUTION INTRA-ARTICULAR; INTRALESIONAL; INTRAMUSCULAR; INTRAVENOUS; SOFT TISSUE
Status: DISCONTINUED | OUTPATIENT
Start: 2025-01-02 | End: 2025-01-02 | Stop reason: SDUPTHER

## 2025-01-02 RX ORDER — DROPERIDOL 2.5 MG/ML
0.62 INJECTION, SOLUTION INTRAMUSCULAR; INTRAVENOUS
Status: DISCONTINUED | OUTPATIENT
Start: 2025-01-02 | End: 2025-01-02 | Stop reason: HOSPADM

## 2025-01-02 RX ORDER — ONDANSETRON 2 MG/ML
4 INJECTION INTRAMUSCULAR; INTRAVENOUS
Status: DISCONTINUED | OUTPATIENT
Start: 2025-01-02 | End: 2025-01-02 | Stop reason: HOSPADM

## 2025-01-02 RX ORDER — SODIUM CHLORIDE 0.9 % (FLUSH) 0.9 %
5-40 SYRINGE (ML) INJECTION EVERY 12 HOURS SCHEDULED
Status: DISCONTINUED | OUTPATIENT
Start: 2025-01-02 | End: 2025-01-02 | Stop reason: HOSPADM

## 2025-01-02 RX ORDER — LABETALOL HYDROCHLORIDE 5 MG/ML
10 INJECTION, SOLUTION INTRAVENOUS
Status: DISCONTINUED | OUTPATIENT
Start: 2025-01-02 | End: 2025-01-02 | Stop reason: HOSPADM

## 2025-01-02 RX ORDER — SODIUM CHLORIDE 0.9 % (FLUSH) 0.9 %
5-40 SYRINGE (ML) INJECTION PRN
Status: DISCONTINUED | OUTPATIENT
Start: 2025-01-02 | End: 2025-01-02 | Stop reason: HOSPADM

## 2025-01-02 RX ORDER — FENTANYL CITRATE 50 UG/ML
50 INJECTION, SOLUTION INTRAMUSCULAR; INTRAVENOUS EVERY 5 MIN PRN
Status: DISCONTINUED | OUTPATIENT
Start: 2025-01-02 | End: 2025-01-02 | Stop reason: HOSPADM

## 2025-01-02 RX ORDER — FENTANYL CITRATE 50 UG/ML
INJECTION, SOLUTION INTRAMUSCULAR; INTRAVENOUS
Status: DISCONTINUED | OUTPATIENT
Start: 2025-01-02 | End: 2025-01-02 | Stop reason: SDUPTHER

## 2025-01-02 RX ORDER — LIDOCAINE HYDROCHLORIDE 20 MG/ML
INJECTION, SOLUTION INTRAVENOUS
Status: DISCONTINUED | OUTPATIENT
Start: 2025-01-02 | End: 2025-01-02 | Stop reason: SDUPTHER

## 2025-01-02 RX ORDER — NALOXONE HYDROCHLORIDE 0.4 MG/ML
INJECTION, SOLUTION INTRAMUSCULAR; INTRAVENOUS; SUBCUTANEOUS PRN
Status: DISCONTINUED | OUTPATIENT
Start: 2025-01-02 | End: 2025-01-02 | Stop reason: HOSPADM

## 2025-01-02 RX ORDER — ONDANSETRON 2 MG/ML
INJECTION INTRAMUSCULAR; INTRAVENOUS
Status: DISCONTINUED | OUTPATIENT
Start: 2025-01-02 | End: 2025-01-02 | Stop reason: SDUPTHER

## 2025-01-02 RX ORDER — MEPERIDINE HYDROCHLORIDE 25 MG/ML
12.5 INJECTION INTRAMUSCULAR; INTRAVENOUS; SUBCUTANEOUS EVERY 5 MIN PRN
Status: DISCONTINUED | OUTPATIENT
Start: 2025-01-02 | End: 2025-01-02 | Stop reason: HOSPADM

## 2025-01-02 RX ORDER — SODIUM CHLORIDE, SODIUM LACTATE, POTASSIUM CHLORIDE, CALCIUM CHLORIDE 600; 310; 30; 20 MG/100ML; MG/100ML; MG/100ML; MG/100ML
INJECTION, SOLUTION INTRAVENOUS CONTINUOUS
Status: DISCONTINUED | OUTPATIENT
Start: 2025-01-02 | End: 2025-01-02 | Stop reason: HOSPADM

## 2025-01-02 RX ORDER — PROPOFOL 10 MG/ML
INJECTION, EMULSION INTRAVENOUS
Status: DISCONTINUED | OUTPATIENT
Start: 2025-01-02 | End: 2025-01-02 | Stop reason: SDUPTHER

## 2025-01-02 RX ORDER — OXYCODONE HYDROCHLORIDE 5 MG/1
5 TABLET ORAL
Status: DISCONTINUED | OUTPATIENT
Start: 2025-01-02 | End: 2025-01-02 | Stop reason: HOSPADM

## 2025-01-02 RX ORDER — SODIUM CHLORIDE 9 MG/ML
INJECTION, SOLUTION INTRAVENOUS PRN
Status: DISCONTINUED | OUTPATIENT
Start: 2025-01-02 | End: 2025-01-02 | Stop reason: HOSPADM

## 2025-01-02 RX ADMIN — DEXAMETHASONE SODIUM PHOSPHATE 4 MG: 4 INJECTION, SOLUTION INTRAMUSCULAR; INTRAVENOUS at 07:58

## 2025-01-02 RX ADMIN — LIDOCAINE HYDROCHLORIDE 50 MG: 20 INJECTION, SOLUTION INTRAVENOUS at 07:51

## 2025-01-02 RX ADMIN — SODIUM CHLORIDE, POTASSIUM CHLORIDE, SODIUM LACTATE AND CALCIUM CHLORIDE: 600; 310; 30; 20 INJECTION, SOLUTION INTRAVENOUS at 06:48

## 2025-01-02 RX ADMIN — CEFAZOLIN 2000 MG: 2 INJECTION, POWDER, FOR SOLUTION INTRAMUSCULAR; INTRAVENOUS at 07:58

## 2025-01-02 RX ADMIN — ONDANSETRON 4 MG: 2 INJECTION INTRAMUSCULAR; INTRAVENOUS at 07:58

## 2025-01-02 RX ADMIN — FENTANYL CITRATE 100 MCG: 50 INJECTION, SOLUTION INTRAMUSCULAR; INTRAVENOUS at 07:51

## 2025-01-02 RX ADMIN — PROPOFOL 150 MG: 10 INJECTION, EMULSION INTRAVENOUS at 07:51

## 2025-01-02 ASSESSMENT — PAIN SCALES - GENERAL
PAINLEVEL_OUTOF10: 0

## 2025-01-02 ASSESSMENT — PAIN SCALES - WONG BAKER
WONGBAKER_NUMERICALRESPONSE: NO HURT
WONGBAKER_NUMERICALRESPONSE: NO HURT

## 2025-01-02 ASSESSMENT — PAIN - FUNCTIONAL ASSESSMENT
PAIN_FUNCTIONAL_ASSESSMENT: 0-10
PAIN_FUNCTIONAL_ASSESSMENT: 0-10

## 2025-01-02 NOTE — ANESTHESIA POSTPROCEDURE EVALUATION
Department of Anesthesiology  Postprocedure Note    Patient: Kade Randall  MRN: [id number]  YOB: 1940  Date of evaluation: 1/2/2025    Procedure Summary       Date: 01/02/25 Room / Location: [address]    Anesthesia Start: 0748 Anesthesia Stop: 0841    Procedure: CYSTOSCOPY LITHOLAPAXY Diagnosis:       Gross hematuria      (Gross hematuria [R31.0])    Surgeons: Ana M Candelario MD Responsible Provider: Kerry Gallardo MD    Anesthesia Type: General ASA Status: 3            Anesthesia Type: General    Misty Phase I: Misty Score: 10    Misty Phase II: Misty Score: 10    Anesthesia Post Evaluation    Patient location during evaluation: PACU  Patient participation: complete - patient participated  Level of consciousness: sleepy but conscious  Pain score: 3  Airway patency: patent  Nausea & Vomiting: no nausea and no vomiting  Cardiovascular status: blood pressure returned to baseline  Respiratory status: nasal cannula  Hydration status: euvolemic  Multimodal analgesia pain management approach  Pain management: adequate    No notable events documented.

## 2025-01-02 NOTE — OP NOTE
Operative Note      Patient: Kade Randall  YOB: 1940  MRN: [id number]    Date of Procedure: 1/2/2025    Pre-Op Diagnosis Codes:      * Gross hematuria [R31.0]    Post-Op Diagnosis: Same       Procedure(s):  CYSTOSCOPY LITHOLAPAXY    Surgeon(s):  Ana M Candelario MD    Assistant:   * No surgical staff found *    Anesthesia: General    Estimated Blood Loss (mL): Minimal    Complications: None    Specimens:   ID Type Source Tests Collected by Time Destination   1 : kidney stone Stone (Calculus) Kidney STONE ANALYSIS Ana M Candelario MD 1/2/2025 0822        Implants:  * No implants in log *      Drains: * No LDAs found *    Findings:  Infection Present At Time Of Surgery (PATOS) (choose all levels that have infection present):  No infection present  Other Findings: 2cm stones x 2    Detailed Description of Procedure:   This is a 84-year-old gentleman has had a transurethral section of prostate in the past who on workup was noted to have 2 large stones in the bladder history of recurrent UTIs.  He is here now for cystoscopy litholapaxy.    Description procedure: Patient defined holding area taken to procedure room placed in dorsolithotomy position.  Patient's genital region was prepped radial fashion.  23 Salvadorean cystoscopy she was induced per urethra causation using a 550 µm Artemio laser fiber under bladder settings for fragmentation and dusting we are to break up both those 2 cm lexi stones in the small fragments Ellik evacuator use evacuate all that out and at the end I did use a 20 Salvadorean 10 cc Lares catheter and then patient taken cover entire procedure well    Summary patient discharged able condition we will see her back in the office tomorrow morning for Lares catheter management.    Electronically signed by Ana M Candelario MD on 1/2/2025 at 8:30 AM

## 2025-01-02 NOTE — PROGRESS NOTES
0837- pt. Arrived to pacu via cart from OR. Pt. Attached to monitor and alarms are on. Received report from ROSLYN Crespo and IZA Garcia. Pt. Drowsy from anesthesia, oral airway in placed upon arrival but taken out by CRNA. Pt. Responds to verbal stimuli and able to follow simple commands. Pt. Is wearing a simple mask at 6L. SR on the monitor. Pt. Has a asencio catheter draining clear, bloody urine. No clots seen. Pt. Denies pain or n/v.   0845- Dr. Candelario at bedside to speak with pt.   0910- pt. Is aox4. Denies pain or n/v. Has tolerated sips of water. Pt. On RA sating 97%. SR on the monitor. Pt catheter draining peach colored urine. 150cc emptied. Pt. Updated on plan of care and denies any other questions or concerns at this time.

## 2025-01-02 NOTE — PROGRESS NOTES
0920: Patient returns to [facility] following procedure, bedside report received from Jing COUCH, VSS, family at bedside, call light in reach, beverage of choice provided.   0947: VSS, discharge instructions reviewed with patient, spouse, daughter. All verbalized understanding. Lares bag changed to leg bag per pt request.   1005: patient dressing, call light in reach, IV removed.   1015: patient taken via wheelchair to DC to car with family

## 2025-01-02 NOTE — DISCHARGE INSTRUCTIONS
RETURN TO DR. CORTEZ'S OFFICE TOMORROW MORNING BETWEEN 8/9 AM FOR REMOVAL OF CABELLO CATHETER.     CONTINUE TAKING ANTIBIOTICS AT HOME.         [facility]  [phone]    Do not drive, work around machines or use equipment.  Do not drink any alcoholic beverages.  Do not smoke while alone.  Avoid making important decisions.  Plan to spend a quiet, relaxed evening @ home.  Resume normal activities as you begin to feel better.  Eat lightly for your first meal, then gradually increase your diet to what is normal for you.  In case of nausea, avoid food and drink only clear liquids.  Resume food as nausea ceases.  Notify your surgeon if you experience fever, chills, large amount of bleeding, difficulty breathing, persistent nausea and vomiting or any other disturbing problem.  Call for a follow-up appointment with your surgeon.

## 2025-01-02 NOTE — BRIEF OP NOTE
Brief Postoperative Note      Patient: Kade Randall  YOB: 1940  MRN: [id number]    Date of Procedure: 1/2/2025    Pre-Op Diagnosis Codes:      * Gross hematuria [R31.0]    Post-Op Diagnosis: Same       Procedure(s):  CYSTOSCOPY LITHOLAPAXY    Surgeon(s):  Ana M Candelario MD    Assistant:  * No surgical staff found *    Anesthesia: General    Estimated Blood Loss (mL): Minimal    Complications: None    Specimens:   ID Type Source Tests Collected by Time Destination   1 : kidney stone Stone (Calculus) Kidney STONE ANALYSIS Ana M Candelario MD 1/2/2025 0822        Implants:  * No implants in log *      Drains: * No LDAs found *    Findings:  Infection Present At Time Of Surgery (PATOS) (choose all levels that have infection present):  No infection present  Other Findings: two 2cm lexi stones    Electronically signed by Ana M Candelario MD on 1/2/2025 at 8:29 AM

## 2025-01-03 LAB — SURGICAL PATHOLOGY REPORT: NORMAL

## 2025-01-04 LAB
STONE COMPOSITION: NORMAL
STONE DESCRIPTION: NORMAL
STONE MASS: 3 MG

## 2025-02-06 ENCOUNTER — OFFICE VISIT (OUTPATIENT)
Dept: CARDIOLOGY CLINIC | Age: 85
End: 2025-02-06
Payer: MEDICARE

## 2025-02-06 VITALS — HEART RATE: 82 BPM | OXYGEN SATURATION: 93 % | HEIGHT: 74 IN | BODY MASS INDEX: 22.33 KG/M2 | WEIGHT: 174 LBS

## 2025-02-06 DIAGNOSIS — R06.02 SHORTNESS OF BREATH: ICD-10-CM

## 2025-02-06 DIAGNOSIS — I38 VHD (VALVULAR HEART DISEASE): Primary | ICD-10-CM

## 2025-02-06 PROCEDURE — 1123F ACP DISCUSS/DSCN MKR DOCD: CPT | Performed by: INTERNAL MEDICINE

## 2025-02-06 PROCEDURE — G8420 CALC BMI NORM PARAMETERS: HCPCS | Performed by: INTERNAL MEDICINE

## 2025-02-06 PROCEDURE — 1036F TOBACCO NON-USER: CPT | Performed by: INTERNAL MEDICINE

## 2025-02-06 PROCEDURE — 99214 OFFICE O/P EST MOD 30 MIN: CPT | Performed by: INTERNAL MEDICINE

## 2025-02-06 PROCEDURE — 1159F MED LIST DOCD IN RCRD: CPT | Performed by: INTERNAL MEDICINE

## 2025-02-06 PROCEDURE — 93000 ELECTROCARDIOGRAM COMPLETE: CPT | Performed by: INTERNAL MEDICINE

## 2025-02-06 PROCEDURE — G8427 DOCREV CUR MEDS BY ELIG CLIN: HCPCS | Performed by: INTERNAL MEDICINE

## 2025-02-06 NOTE — PROGRESS NOTES
CARDIOLOGY NOTE      2/6/2025    RE: Kade Randall  (1940)                                TO:  Marcio Becerra MD      New pt      CHIEF COMPLAINT   Kade is a 84 y.o. male who was seen today for management of  VHD                      Here for follow-up         HPI:                   Pt has h/o AR, AS , SAVR,hyperlipidimea, seen today for  To follow-up.  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/21/2022    VHD (valvular heart disease) 12/07/2021    Left inguinal hernia 05/16/2017    Hyperlipidemia 01/04/2015    Prostate hypertrophy 01/04/2015    Glucose intolerance (impaired glucose tolerance) 01/04/2015    Erectile dysfunction 01/04/2015     Current Outpatient Medications   Medication Sig Dispense Refill    Glucos-Chondroit-Hyaluron-MSM (GLUCOSAMINE CHONDROITIN JOINT PO) Take 1 tablet by mouth daily 3248-7803 mg      lisinopril (PRINIVIL;ZESTRIL) 2.5 MG tablet Take 1 tablet by mouth daily      metFORMIN (GLUCOPHAGE) 500 MG tablet       atorvastatin (LIPITOR) 40 MG tablet       CIALIS 5 MG tablet TAKE 1 TABLET DAILY 90 tablet 3    doxazosin (CARDURA) 4 MG tablet TAKE 1 TABLET AT BEDTIME ASDIRECTED 90 tablet 3    Glucose Blood (BLOOD GLUCOSE TEST STRIPS) STRP Uses Freestyle Lite glucometer-tests twice daily or as needed Dx: E11.9 200 strip 3    aspirin EC 81 MG EC tablet Take 1 tablet by mouth Every other day      Cholecalciferol (VITAMIN D3) 1.25 MG (91773 UT) CAPS Take by mouth (Patient not taking: Reported on 2/6/2025)      metoprolol tartrate (LOPRESSOR) 25 MG tablet  (Patient not taking: Reported on 7/17/2023)      cyclobenzaprine (FLEXERIL) 10 MG tablet Take 10 mg by mouth (Patient not taking: Reported on 10/12/2022)       No current facility-administered medications for this visit.     Allergies: Sulfa antibiotics  Past Medical History:   Diagnosis Date    Bladder stones 08/2017

## 2025-02-25 ENCOUNTER — TELEPHONE (OUTPATIENT)
Dept: CARDIOLOGY CLINIC | Age: 85
End: 2025-02-25

## 2025-02-25 NOTE — TELEPHONE ENCOUNTER
Pt notified of results and reminded of f/u appt. Pt voiced understanding.     Interpretation Summary      Left Ventricle: Normal left ventricular systolic function with a visually estimated EF of 50 - 55%. Left ventricle size is normal. Normal wall thickness. Normal wall motion. Diastolic dysfunction present with normal LV EF.    Aortic Valve: Bioprosthetic valve that is well-seated. AV mean gradient is 23 mmHg.    Mitral Valve: Mild regurgitation.    Tricuspid Valve: Moderate regurgitation. Mildly elevated RVSP, consistent with mild pulmonary hypertension. The estimated RVSP is 37 mmHg.    Right Atrium: Right atrium is moderately dilated.    Image quality is adequate.

## 2025-08-11 ENCOUNTER — TELEPHONE (OUTPATIENT)
Dept: CARDIOLOGY CLINIC | Age: 85
End: 2025-08-11

## 2025-08-11 ENCOUNTER — OFFICE VISIT (OUTPATIENT)
Dept: CARDIOLOGY CLINIC | Age: 85
End: 2025-08-11
Payer: MEDICARE

## 2025-08-11 VITALS
HEART RATE: 88 BPM | BODY MASS INDEX: 21.28 KG/M2 | WEIGHT: 165.8 LBS | HEIGHT: 74 IN | SYSTOLIC BLOOD PRESSURE: 102 MMHG | DIASTOLIC BLOOD PRESSURE: 64 MMHG

## 2025-08-11 DIAGNOSIS — I38 VHD (VALVULAR HEART DISEASE): Primary | ICD-10-CM

## 2025-08-11 DIAGNOSIS — E78.2 MIXED HYPERLIPIDEMIA: ICD-10-CM

## 2025-08-11 PROBLEM — I33.0 BACTERIAL ENDOCARDITIS: Status: RESOLVED | Noted: 2022-09-21 | Resolved: 2025-08-11

## 2025-08-11 PROCEDURE — G8427 DOCREV CUR MEDS BY ELIG CLIN: HCPCS | Performed by: NURSE PRACTITIONER

## 2025-08-11 PROCEDURE — G8420 CALC BMI NORM PARAMETERS: HCPCS | Performed by: NURSE PRACTITIONER

## 2025-08-11 PROCEDURE — 1159F MED LIST DOCD IN RCRD: CPT | Performed by: NURSE PRACTITIONER

## 2025-08-11 PROCEDURE — 99214 OFFICE O/P EST MOD 30 MIN: CPT | Performed by: NURSE PRACTITIONER

## 2025-08-11 PROCEDURE — 1123F ACP DISCUSS/DSCN MKR DOCD: CPT | Performed by: NURSE PRACTITIONER

## 2025-08-11 PROCEDURE — 1036F TOBACCO NON-USER: CPT | Performed by: NURSE PRACTITIONER

## 2025-08-11 ASSESSMENT — ENCOUNTER SYMPTOMS
ORTHOPNEA: 0
SHORTNESS OF BREATH: 0

## (undated) DEVICE — DBD-PACK,CYSTOSCOPY,PK VI,AURORA: Brand: MEDLINE

## (undated) DEVICE — MARKER SURG SKIN UTIL REGULAR/FINE 2 TIP W/ RUL AND 9 LBL

## (undated) DEVICE — CATHETER,FOLEY,100%SILICONE,20FR,10ML,LF: Brand: MEDLINE

## (undated) DEVICE — TRAY PREP DRY W/ PREM GLV 2 APPL 6 SPNG 2 UNDPD 1 OVERWRAP

## (undated) DEVICE — GLOVE ORANGE PI 8   MSG9080

## (undated) DEVICE — JELLY,LUBE,STERILE,FLIP TOP,TUBE,2-OZ: Brand: MEDLINE

## (undated) DEVICE — TUBING, SUCTION, 9/32" X 10', STRAIGHT: Brand: MEDLINE

## (undated) DEVICE — MAT ABSRB W28XL48IN C6L FLR ULT W POLY BK

## (undated) DEVICE — 4-PORT MANIFOLD: Brand: NEPTUNE 2

## (undated) DEVICE — GOWN,SIRUS,POLYRNF,BRTHSLV,XLN/XL,20/CS: Brand: MEDLINE

## (undated) DEVICE — CONTAINER,SPECIMEN,OR STERILE,4OZ: Brand: MEDLINE

## (undated) DEVICE — SINGLE PORT MANIFOLD: Brand: NEPTUNE 2

## (undated) DEVICE — UROLOGIC DRAIN BAG: Brand: UNBRANDED

## (undated) DEVICE — Z INACTIVE USE 2660663 SOLUTION IRRIG 1000ML STRIL H2O USP PLAS POUR BTL

## (undated) DEVICE — Z INACTIVE USE 2635503 SOLUTION IRRIG 3000ML ST H2O USP UROMATIC PLAS CONT

## (undated) DEVICE — FIBER LASER 550UM 6J 80HZ 120W D F L FOR LITHO MOSES